# Patient Record
Sex: FEMALE | Race: WHITE | Employment: OTHER | URBAN - METROPOLITAN AREA
[De-identification: names, ages, dates, MRNs, and addresses within clinical notes are randomized per-mention and may not be internally consistent; named-entity substitution may affect disease eponyms.]

---

## 2017-05-01 ENCOUNTER — HOSPITAL ENCOUNTER (EMERGENCY)
Facility: HOSPITAL | Age: 37
Discharge: HOME | End: 2017-05-01
Attending: EMERGENCY MEDICINE
Payer: MEDICARE

## 2017-05-01 ENCOUNTER — APPOINTMENT (OUTPATIENT)
Dept: CT IMAGING | Facility: HOSPITAL | Age: 37
End: 2017-05-01
Attending: EMERGENCY MEDICINE
Payer: MEDICARE

## 2017-05-01 VITALS
DIASTOLIC BLOOD PRESSURE: 78 MMHG | OXYGEN SATURATION: 96 % | SYSTOLIC BLOOD PRESSURE: 142 MMHG | WEIGHT: 284.38 LBS | HEART RATE: 76 BPM | TEMPERATURE: 98 F | BODY MASS INDEX: 50 KG/M2 | RESPIRATION RATE: 17 BRPM

## 2017-05-01 DIAGNOSIS — R10.9 FLANK PAIN: Primary | ICD-10-CM

## 2017-05-01 PROCEDURE — 74176 CT ABD & PELVIS W/O CONTRAST: CPT

## 2017-05-01 PROCEDURE — 85025 COMPLETE CBC W/AUTO DIFF WBC: CPT | Performed by: EMERGENCY MEDICINE

## 2017-05-01 PROCEDURE — 81003 URINALYSIS AUTO W/O SCOPE: CPT | Performed by: EMERGENCY MEDICINE

## 2017-05-01 PROCEDURE — 99284 EMERGENCY DEPT VISIT MOD MDM: CPT

## 2017-05-01 PROCEDURE — 96374 THER/PROPH/DIAG INJ IV PUSH: CPT

## 2017-05-01 PROCEDURE — 81025 URINE PREGNANCY TEST: CPT

## 2017-05-01 PROCEDURE — 80053 COMPREHEN METABOLIC PANEL: CPT | Performed by: EMERGENCY MEDICINE

## 2017-05-01 RX ORDER — KETOROLAC TROMETHAMINE 30 MG/ML
15 INJECTION, SOLUTION INTRAMUSCULAR; INTRAVENOUS ONCE
Status: COMPLETED | OUTPATIENT
Start: 2017-05-01 | End: 2017-05-01

## 2017-05-02 NOTE — ED INITIAL ASSESSMENT (HPI)
PT arrives with c/o right flank pain that radiates to her right groin since this morning. Denies urinary sx, nvd.

## 2017-05-02 NOTE — ED PROVIDER NOTES
Patient Seen in: [facility] Emergency Department    History   Patient presents with:  Abdomen/Flank Pain (GI/)    Stated Complaint: R GROIN PAIN    HPI    Patient is a 42-year-old with a history of bilateral congenital hearing loss, previous benito elements reviewed from today and agreed except as otherwise stated in HPI.     Physical Exam       ED Triage Vitals   BP 05/01/17 2212 145/90 mmHg   Pulse 05/01/17 2212 87   Resp 05/01/17 2212 16   Temp 05/01/17 2212 97.8 °F (36.6 °C)   Temp src 05/01/17 22 LAVENDER   RAINBOW DRAW LIGHT GREEN     CT abdomen and pelvis: No obstructing urolithiasis. There is a 5 mm right middle lobe nodule.   FINDINGS:    LUNG BASES:  5 mm right middle lobe nodule on image #17  LIVER:  Normal in shape and contour but limited ev 2.25

## 2017-10-21 ENCOUNTER — HOSPITAL ENCOUNTER (EMERGENCY)
Facility: HOSPITAL | Age: 37
Discharge: HOME | End: 2017-10-21
Attending: EMERGENCY MEDICINE
Payer: MEDICARE

## 2017-10-21 VITALS
HEART RATE: 81 BPM | OXYGEN SATURATION: 100 % | SYSTOLIC BLOOD PRESSURE: 128 MMHG | RESPIRATION RATE: 14 BRPM | BODY MASS INDEX: 48.65 KG/M2 | WEIGHT: 285 LBS | DIASTOLIC BLOOD PRESSURE: 81 MMHG | TEMPERATURE: 99 F | HEIGHT: 64 IN

## 2017-10-21 DIAGNOSIS — R19.7 NAUSEA VOMITING AND DIARRHEA: ICD-10-CM

## 2017-10-21 DIAGNOSIS — N30.00 ACUTE CYSTITIS WITHOUT HEMATURIA: Primary | ICD-10-CM

## 2017-10-21 DIAGNOSIS — R11.2 NAUSEA VOMITING AND DIARRHEA: ICD-10-CM

## 2017-10-21 PROCEDURE — 83690 ASSAY OF LIPASE: CPT | Performed by: EMERGENCY MEDICINE

## 2017-10-21 PROCEDURE — 80053 COMPREHEN METABOLIC PANEL: CPT | Performed by: EMERGENCY MEDICINE

## 2017-10-21 PROCEDURE — 81001 URINALYSIS AUTO W/SCOPE: CPT | Performed by: EMERGENCY MEDICINE

## 2017-10-21 PROCEDURE — 85025 COMPLETE CBC W/AUTO DIFF WBC: CPT | Performed by: EMERGENCY MEDICINE

## 2017-10-21 PROCEDURE — 96361 HYDRATE IV INFUSION ADD-ON: CPT

## 2017-10-21 PROCEDURE — 99284 EMERGENCY DEPT VISIT MOD MDM: CPT

## 2017-10-21 PROCEDURE — 81025 URINE PREGNANCY TEST: CPT

## 2017-10-21 PROCEDURE — 87086 URINE CULTURE/COLONY COUNT: CPT | Performed by: EMERGENCY MEDICINE

## 2017-10-21 PROCEDURE — 96374 THER/PROPH/DIAG INJ IV PUSH: CPT

## 2017-10-21 RX ORDER — ONDANSETRON 4 MG/1
4 TABLET, ORALLY DISINTEGRATING ORAL EVERY 4 HOURS PRN
Qty: 10 TABLET | Refills: 0 | Status: SHIPPED | OUTPATIENT
Start: 2017-10-21 | End: 2017-10-28

## 2017-10-21 RX ORDER — ONDANSETRON 2 MG/ML
4 INJECTION INTRAMUSCULAR; INTRAVENOUS ONCE
Status: COMPLETED | OUTPATIENT
Start: 2017-10-21 | End: 2017-10-21

## 2017-10-21 RX ORDER — SULFAMETHOXAZOLE AND TRIMETHOPRIM 800; 160 MG/1; MG/1
1 TABLET ORAL 2 TIMES DAILY
Qty: 14 TABLET | Refills: 0 | Status: SHIPPED | OUTPATIENT
Start: 2017-10-21 | End: 2017-10-28

## 2017-10-21 NOTE — ED PROVIDER NOTES
Patient Seen in: [facility] Emergency Department    History   Patient presents with:  Abdomen/Flank Pain (GI/)    Stated Complaint: rlq pain, pt is deaf, but speaks    HPI    History is obtained through a language line  as patient is de 98 %  O2 Device: None (Room air)    Current:/88   Pulse 84   Temp 99 °F (37.2 °C) (Temporal)   Resp 16   Ht 162.6 cm (5' 4\")   Wt 129.3 kg   LMP 10/05/2017   SpO2 99%   BMI 48.92 kg/m²         Physical Exam      Constitutional: Pt is oriented to per orders were created for panel order CBC WITH DIFFERENTIAL WITH PLATELET.   Procedure                               Abnormality         Status                     ---------                               -----------         ------                     CBC W/ D Tablet Dispersible  Take 1 tablet (4 mg total) by mouth every 4 (four) hours as needed for Nausea.   Qty: 10 tablet Refills: 0

## 2018-01-23 ENCOUNTER — HOSPITAL ENCOUNTER (OUTPATIENT)
Age: 38
Discharge: HOME | End: 2018-01-23
Payer: MEDICARE

## 2018-01-23 VITALS
OXYGEN SATURATION: 99 % | TEMPERATURE: 98 F | HEART RATE: 87 BPM | RESPIRATION RATE: 16 BRPM | DIASTOLIC BLOOD PRESSURE: 75 MMHG | SYSTOLIC BLOOD PRESSURE: 126 MMHG

## 2018-01-23 DIAGNOSIS — J02.9 PHARYNGITIS, UNSPECIFIED ETIOLOGY: Primary | ICD-10-CM

## 2018-01-23 DIAGNOSIS — J30.9 ALLERGIC RHINITIS, UNSPECIFIED SEASONALITY, UNSPECIFIED TRIGGER: ICD-10-CM

## 2018-01-23 LAB — POCT RAPID STREP: NEGATIVE

## 2018-01-23 PROCEDURE — 87430 STREP A AG IA: CPT | Performed by: PHYSICIAN ASSISTANT

## 2018-01-23 PROCEDURE — 99214 OFFICE O/P EST MOD 30 MIN: CPT

## 2018-01-23 PROCEDURE — 87081 CULTURE SCREEN ONLY: CPT | Performed by: PHYSICIAN ASSISTANT

## 2018-01-23 PROCEDURE — 99213 OFFICE O/P EST LOW 20 MIN: CPT

## 2018-01-24 NOTE — ED PROVIDER NOTES
Patient Seen in: [facility]    History   Patient presents with:  Sore Throat    Stated Complaint: SORE THROAT     HPI    77-year-old female here with complaint of sore throat pain with a nonproductive cough for 3 days.   Patient' external ear and ear canal normal.   Left Ear: Tympanic membrane, external ear and ear canal normal.   Nose: Rhinorrhea present.    Mouth/Throat: Uvula is midline, oropharynx is clear and moist and mucous membranes are normal.   The midline, no trismus or d [address] [place] [address]  [phone]    Schedule an appointment as soon as possible for a visit  Follow-up and further evaluation.         Medications Prescribed:  Current Discharge Medication List

## 2018-01-25 ENCOUNTER — HOSPITAL ENCOUNTER (OUTPATIENT)
Age: 38
Discharge: HOME | End: 2018-01-25
Attending: FAMILY MEDICINE
Payer: MEDICARE

## 2018-01-25 VITALS
SYSTOLIC BLOOD PRESSURE: 141 MMHG | TEMPERATURE: 98 F | DIASTOLIC BLOOD PRESSURE: 102 MMHG | HEART RATE: 76 BPM | RESPIRATION RATE: 16 BRPM

## 2018-01-25 DIAGNOSIS — H92.02 ACUTE OTALGIA, LEFT: ICD-10-CM

## 2018-01-25 DIAGNOSIS — H69.82 EUSTACHIAN TUBE DYSFUNCTION, LEFT: ICD-10-CM

## 2018-01-25 DIAGNOSIS — H10.9 CONJUNCTIVITIS OF RIGHT EYE, UNSPECIFIED CONJUNCTIVITIS TYPE: Primary | ICD-10-CM

## 2018-01-25 PROCEDURE — 99214 OFFICE O/P EST MOD 30 MIN: CPT

## 2018-01-25 PROCEDURE — 99213 OFFICE O/P EST LOW 20 MIN: CPT

## 2018-01-25 RX ORDER — GENTAMICIN SULFATE 3 MG/ML
2 SOLUTION/ DROPS OPHTHALMIC 3 TIMES DAILY
Qty: 5 ML | Refills: 0 | Status: SHIPPED | OUTPATIENT
Start: 2018-01-25 | End: 2018-01-30

## 2018-01-25 NOTE — ED PROVIDER NOTES
Patient Seen in: [address] [facility] [facility]    History   Patient presents with:  Eye Problem    Stated Complaint: eye infection  /cough / Deleta Cydney ZARCO    This 57-year-old female presents the office with complaint of right eye redness and d right conjunctiva is inflamed. The left conjunctiva is mildly injected. No purulent drainage is noted. EARS: Tympanic membranes normal color but the left TM is retracted, no fluid is noted, EAC's normal.  NOSE: Turbinates congested, no bleeding noted. for next 5 days. You're not contagious after 24 hours of medication. Continue to use the Flonase 2 sprays to each nostril once daily after shower as long as you are congested. You may take Tylenol or ibuprofen as needed for fever or pain.   Push fluids a

## 2018-11-08 ENCOUNTER — HOSPITAL ENCOUNTER (EMERGENCY)
Facility: HOSPITAL | Age: 38
Discharge: HOME | End: 2018-11-08
Attending: EMERGENCY MEDICINE
Payer: MEDICARE

## 2018-11-08 ENCOUNTER — APPOINTMENT (OUTPATIENT)
Dept: GENERAL RADIOLOGY | Facility: HOSPITAL | Age: 38
End: 2018-11-08
Attending: EMERGENCY MEDICINE
Payer: MEDICARE

## 2018-11-08 VITALS
BODY MASS INDEX: 48.65 KG/M2 | HEART RATE: 76 BPM | OXYGEN SATURATION: 7 % | WEIGHT: 285 LBS | DIASTOLIC BLOOD PRESSURE: 76 MMHG | RESPIRATION RATE: 16 BRPM | SYSTOLIC BLOOD PRESSURE: 118 MMHG | TEMPERATURE: 99 F | HEIGHT: 64 IN

## 2018-11-08 DIAGNOSIS — M54.9 BACK PAIN WITHOUT RADICULOPATHY: Primary | ICD-10-CM

## 2018-11-08 DIAGNOSIS — K59.00 CONSTIPATION, UNSPECIFIED CONSTIPATION TYPE: ICD-10-CM

## 2018-11-08 PROCEDURE — 99284 EMERGENCY DEPT VISIT MOD MDM: CPT

## 2018-11-08 PROCEDURE — 81003 URINALYSIS AUTO W/O SCOPE: CPT | Performed by: EMERGENCY MEDICINE

## 2018-11-08 PROCEDURE — 74019 RADEX ABDOMEN 2 VIEWS: CPT | Performed by: EMERGENCY MEDICINE

## 2018-11-08 PROCEDURE — 72110 X-RAY EXAM L-2 SPINE 4/>VWS: CPT | Performed by: EMERGENCY MEDICINE

## 2018-11-08 RX ORDER — METHYLPREDNISOLONE 4 MG/1
TABLET ORAL
Qty: 1 PACKAGE | Refills: 0 | Status: SHIPPED | OUTPATIENT
Start: 2018-11-08 | End: 2018-11-13

## 2018-11-08 RX ORDER — POLYETHYLENE GLYCOL 3350 17 G/17G
17 POWDER, FOR SOLUTION ORAL DAILY PRN
Qty: 12 EACH | Refills: 0 | Status: SHIPPED | OUTPATIENT
Start: 2018-11-08 | End: 2018-12-08

## 2018-11-08 RX ORDER — NAPROXEN 500 MG/1
500 TABLET ORAL 2 TIMES DAILY PRN
Qty: 20 TABLET | Refills: 0 | Status: SHIPPED | OUTPATIENT
Start: 2018-11-08 | End: 2019-02-06 | Stop reason: ALTCHOICE

## 2018-11-08 RX ORDER — CYCLOBENZAPRINE HCL 10 MG
10 TABLET ORAL 3 TIMES DAILY PRN
Qty: 20 TABLET | Refills: 0 | Status: SHIPPED | OUTPATIENT
Start: 2018-11-08 | End: 2019-02-06 | Stop reason: ALTCHOICE

## 2018-11-08 NOTE — ED PROVIDER NOTES
Patient Seen in: [facility] Emergency Department    History   Patient presents with:  Back Pain (musculoskeletal)    Stated Complaint: back pain x 2 months    HPI    34 old female presents to the emergency department with complaints of low back pain 11/02/2018   SpO2 (!) 7%   BMI 48.92 kg/m²         Physical Exam   Constitutional: She is oriented to person, place, and time. She appears well-developed and well-nourished. HENT:   Head: Normocephalic and atraumatic.    Mouth/Throat: Oropharynx is clear hypertrophic changes at L5-S1 are noted.  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   Dictated by: Carol Gutierrez MD on 11/08/2018 at 10:23     Approved by: Carol Gutierrez MD            Xr times daily as needed. , Print Script, Disp-20 tablet, R-0    Cyclobenzaprine HCl 10 MG Oral Tab  Take 1 tablet (10 mg total) by mouth 3 (three) times daily as needed for Muscle spasms. , Print Script, Disp-20 tablet, R-0    PEG 3350 Oral Powd Pack  Take 17

## 2019-02-03 ENCOUNTER — HOSPITAL ENCOUNTER (OUTPATIENT)
Age: 39
Discharge: HOME | End: 2019-02-03
Payer: MEDICARE

## 2019-02-03 ENCOUNTER — APPOINTMENT (OUTPATIENT)
Dept: GENERAL RADIOLOGY | Age: 39
End: 2019-02-03
Attending: NURSE PRACTITIONER
Payer: MEDICARE

## 2019-02-03 VITALS
WEIGHT: 290 LBS | HEIGHT: 65 IN | RESPIRATION RATE: 16 BRPM | OXYGEN SATURATION: 98 % | TEMPERATURE: 98 F | BODY MASS INDEX: 48.32 KG/M2 | SYSTOLIC BLOOD PRESSURE: 115 MMHG | DIASTOLIC BLOOD PRESSURE: 70 MMHG | HEART RATE: 75 BPM

## 2019-02-03 DIAGNOSIS — J06.9 VIRAL UPPER RESPIRATORY TRACT INFECTION WITH COUGH: Primary | ICD-10-CM

## 2019-02-03 PROCEDURE — 99214 OFFICE O/P EST MOD 30 MIN: CPT

## 2019-02-03 PROCEDURE — 99213 OFFICE O/P EST LOW 20 MIN: CPT

## 2019-02-03 PROCEDURE — 71046 X-RAY EXAM CHEST 2 VIEWS: CPT | Performed by: NURSE PRACTITIONER

## 2019-02-03 RX ORDER — METHYLPREDNISOLONE 4 MG/1
TABLET ORAL
Qty: 1 PACKAGE | Refills: 0 | Status: SHIPPED | OUTPATIENT
Start: 2019-02-03 | End: 2019-02-06 | Stop reason: SINTOL

## 2019-02-03 NOTE — ED PROVIDER NOTES
Patient Seen in: [facility]    History   Patient presents with:  Headache (neurologic)    Stated Complaint: Michelle Lemon, COUGH    78-year-old female presents today with complaints of sinus pressure congestion and hea 01/28/2019   BMI 48.26 kg/m²         Physical Exam   Constitutional: She is oriented to person, place, and time. She appears well-developed and well-nourished. No distress. HENT:   Head: Normocephalic.    Right Ear: Tympanic membrane and ear canal normal. given prescription for Medrol Dosepak to help with inflammation and sinus pressure. Patient encouraged take over-the-counter antihistamine decongestant and to push fluids rest.  Also suggested using a coolmist humidifier at the bedside.   To follow with pr

## 2019-02-03 NOTE — ED INITIAL ASSESSMENT (HPI)
Cough with sinus pressure/congestion/headache x 4 days. States she gets sweaty but denies fevers. When she blows her nose she feels discomfort in neck.

## 2019-07-20 ENCOUNTER — HOSPITAL ENCOUNTER (INPATIENT)
Facility: HOSPITAL | Age: 39
LOS: 9 days | Discharge: HOME HEALTH | DRG: 854 | End: 2019-07-29
Attending: EMERGENCY MEDICINE | Admitting: SURGERY
Payer: MEDICARE

## 2019-07-20 ENCOUNTER — APPOINTMENT (OUTPATIENT)
Dept: CT IMAGING | Facility: HOSPITAL | Age: 39
DRG: 854 | End: 2019-07-20
Attending: EMERGENCY MEDICINE
Payer: MEDICARE

## 2019-07-20 ENCOUNTER — ANESTHESIA EVENT (OUTPATIENT)
Dept: SURGERY | Facility: HOSPITAL | Age: 39
DRG: 854 | End: 2019-07-20
Payer: MEDICARE

## 2019-07-20 ENCOUNTER — ANESTHESIA (OUTPATIENT)
Dept: SURGERY | Facility: HOSPITAL | Age: 39
DRG: 854 | End: 2019-07-20
Payer: MEDICARE

## 2019-07-20 DIAGNOSIS — M79.659 THIGH PAIN: ICD-10-CM

## 2019-07-20 DIAGNOSIS — N73.9 PELVIC ABSCESS IN FEMALE: Primary | ICD-10-CM

## 2019-07-20 DIAGNOSIS — N76.89 FOURNIER'S GANGRENE IN FEMALE: ICD-10-CM

## 2019-07-20 LAB
ALBUMIN SERPL-MCNC: 4.1 G/DL (ref 3.4–5)
ALBUMIN/GLOB SERPL: 1 {RATIO} (ref 1–2)
ALP LIVER SERPL-CCNC: 113 U/L (ref 37–98)
ALT SERPL-CCNC: 18 U/L (ref 13–56)
ANION GAP SERPL CALC-SCNC: 8 MMOL/L (ref 0–18)
AST SERPL-CCNC: 8 U/L (ref 15–37)
BASOPHILS # BLD AUTO: 0.04 X10(3) UL (ref 0–0.2)
BASOPHILS NFR BLD AUTO: 0.2 %
BILIRUB SERPL-MCNC: 1.1 MG/DL (ref 0.1–2)
BILIRUB UR QL STRIP.AUTO: NEGATIVE
BUN BLD-MCNC: 14 MG/DL (ref 7–18)
BUN/CREAT SERPL: 16.3 (ref 10–20)
CALCIUM BLD-MCNC: 9.6 MG/DL (ref 8.5–10.1)
CHLORIDE SERPL-SCNC: 101 MMOL/L (ref 98–112)
CO2 SERPL-SCNC: 26 MMOL/L (ref 21–32)
CREAT BLD-MCNC: 0.86 MG/DL (ref 0.55–1.02)
DEPRECATED RDW RBC AUTO: 40.5 FL (ref 35.1–46.3)
EOSINOPHIL # BLD AUTO: 0.05 X10(3) UL (ref 0–0.7)
EOSINOPHIL NFR BLD AUTO: 0.2 %
ERYTHROCYTE [DISTWIDTH] IN BLOOD BY AUTOMATED COUNT: 12.8 % (ref 11–15)
EST. AVERAGE GLUCOSE BLD GHB EST-MCNC: 108 MG/DL (ref 68–126)
GLOBULIN PLAS-MCNC: 4.2 G/DL (ref 2.8–4.4)
GLUCOSE BLD-MCNC: 113 MG/DL (ref 70–99)
GLUCOSE UR STRIP.AUTO-MCNC: NEGATIVE MG/DL
HBA1C MFR BLD HPLC: 5.4 % (ref ?–5.7)
HCT VFR BLD AUTO: 41.9 % (ref 35–48)
HGB BLD-MCNC: 14.1 G/DL (ref 12–16)
IMM GRANULOCYTES # BLD AUTO: 0.18 X10(3) UL (ref 0–1)
IMM GRANULOCYTES NFR BLD: 0.8 %
KETONES UR STRIP.AUTO-MCNC: 20 MG/DL
LACTATE SERPL-SCNC: 1.1 MMOL/L (ref 0.4–2)
LACTATE SERPL-SCNC: 1.1 MMOL/L (ref 0.4–2)
LEUKOCYTE ESTERASE UR QL STRIP.AUTO: NEGATIVE
LYMPHOCYTES # BLD AUTO: 1.44 X10(3) UL (ref 1–4)
LYMPHOCYTES NFR BLD AUTO: 6.4 %
M PROTEIN MFR SERPL ELPH: 8.3 G/DL (ref 6.4–8.2)
MCH RBC QN AUTO: 29 PG (ref 26–34)
MCHC RBC AUTO-ENTMCNC: 33.7 G/DL (ref 31–37)
MCV RBC AUTO: 86 FL (ref 80–100)
MONOCYTES # BLD AUTO: 1.28 X10(3) UL (ref 0.1–1)
MONOCYTES NFR BLD AUTO: 5.7 %
NEUTROPHILS # BLD AUTO: 19.34 X10 (3) UL (ref 1.5–7.7)
NEUTROPHILS # BLD AUTO: 19.34 X10(3) UL (ref 1.5–7.7)
NEUTROPHILS NFR BLD AUTO: 86.7 %
NITRITE UR QL STRIP.AUTO: NEGATIVE
OSMOLALITY SERPL CALC.SUM OF ELEC: 281 MOSM/KG (ref 275–295)
PH UR STRIP.AUTO: 5 [PH] (ref 4.5–8)
PLATELET # BLD AUTO: 385 10(3)UL (ref 150–450)
POCT URINE PREGNANCY: NEGATIVE
POTASSIUM SERPL-SCNC: 2.9 MMOL/L (ref 3.5–5.1)
POTASSIUM SERPL-SCNC: 3.2 MMOL/L (ref 3.5–5.1)
PROCALCITONIN SERPL-MCNC: 0.12 NG/ML
PROT UR STRIP.AUTO-MCNC: 30 MG/DL
RBC # BLD AUTO: 4.87 X10(6)UL (ref 3.8–5.3)
RBC UR QL AUTO: NEGATIVE
SODIUM SERPL-SCNC: 135 MMOL/L (ref 136–145)
SP GR UR STRIP.AUTO: 1.03 (ref 1–1.03)
UROBILINOGEN UR STRIP.AUTO-MCNC: <2 MG/DL
WBC # BLD AUTO: 22.3 X10(3) UL (ref 4–11)

## 2019-07-20 PROCEDURE — 0JDB0ZZ EXTRACTION OF PERINEUM SUBCUTANEOUS TISSUE AND FASCIA, OPEN APPROACH: ICD-10-PCS | Performed by: SURGERY

## 2019-07-20 PROCEDURE — 74177 CT ABD & PELVIS W/CONTRAST: CPT | Performed by: EMERGENCY MEDICINE

## 2019-07-20 PROCEDURE — 99291 CRITICAL CARE FIRST HOUR: CPT | Performed by: NURSE PRACTITIONER

## 2019-07-20 RX ORDER — DOCUSATE SODIUM 100 MG/1
100 CAPSULE, LIQUID FILLED ORAL 2 TIMES DAILY
Status: DISCONTINUED | OUTPATIENT
Start: 2019-07-20 | End: 2019-07-29

## 2019-07-20 RX ORDER — POLYETHYLENE GLYCOL 3350 17 G/17G
17 POWDER, FOR SOLUTION ORAL DAILY PRN
Status: DISCONTINUED | OUTPATIENT
Start: 2019-07-20 | End: 2019-07-29

## 2019-07-20 RX ORDER — HYDROMORPHONE HYDROCHLORIDE 1 MG/ML
0.4 INJECTION, SOLUTION INTRAMUSCULAR; INTRAVENOUS; SUBCUTANEOUS EVERY 5 MIN PRN
Status: DISCONTINUED | OUTPATIENT
Start: 2019-07-20 | End: 2019-07-20 | Stop reason: HOSPADM

## 2019-07-20 RX ORDER — ACETAMINOPHEN 325 MG/1
650 TABLET ORAL EVERY 4 HOURS PRN
Status: DISCONTINUED | OUTPATIENT
Start: 2019-07-20 | End: 2019-07-29

## 2019-07-20 RX ORDER — SODIUM CHLORIDE 9 MG/ML
INJECTION, SOLUTION INTRAVENOUS CONTINUOUS
Status: DISCONTINUED | OUTPATIENT
Start: 2019-07-20 | End: 2019-07-20

## 2019-07-20 RX ORDER — HYDROMORPHONE HYDROCHLORIDE 1 MG/ML
0.4 INJECTION, SOLUTION INTRAMUSCULAR; INTRAVENOUS; SUBCUTANEOUS EVERY 2 HOUR PRN
Status: DISCONTINUED | OUTPATIENT
Start: 2019-07-20 | End: 2019-07-29

## 2019-07-20 RX ORDER — SODIUM PHOSPHATE, DIBASIC AND SODIUM PHOSPHATE, MONOBASIC 7; 19 G/133ML; G/133ML
1 ENEMA RECTAL ONCE AS NEEDED
Status: DISCONTINUED | OUTPATIENT
Start: 2019-07-20 | End: 2019-07-29

## 2019-07-20 RX ORDER — ONDANSETRON 2 MG/ML
4 INJECTION INTRAMUSCULAR; INTRAVENOUS EVERY 4 HOURS PRN
Status: DISCONTINUED | OUTPATIENT
Start: 2019-07-20 | End: 2019-07-20

## 2019-07-20 RX ORDER — HYDROCODONE BITARTRATE AND ACETAMINOPHEN 5; 325 MG/1; MG/1
1 TABLET ORAL EVERY 4 HOURS PRN
Status: DISCONTINUED | OUTPATIENT
Start: 2019-07-20 | End: 2019-07-29

## 2019-07-20 RX ORDER — ACETAMINOPHEN 500 MG
TABLET ORAL
Status: DISPENSED
Start: 2019-07-20 | End: 2019-07-21

## 2019-07-20 RX ORDER — KETOROLAC TROMETHAMINE 30 MG/ML
30 INJECTION, SOLUTION INTRAMUSCULAR; INTRAVENOUS ONCE
Status: COMPLETED | OUTPATIENT
Start: 2019-07-20 | End: 2019-07-20

## 2019-07-20 RX ORDER — SODIUM CHLORIDE, SODIUM LACTATE, POTASSIUM CHLORIDE, CALCIUM CHLORIDE 600; 310; 30; 20 MG/100ML; MG/100ML; MG/100ML; MG/100ML
INJECTION, SOLUTION INTRAVENOUS CONTINUOUS
Status: DISCONTINUED | OUTPATIENT
Start: 2019-07-20 | End: 2019-07-29

## 2019-07-20 RX ORDER — MORPHINE SULFATE 4 MG/ML
4 INJECTION, SOLUTION INTRAMUSCULAR; INTRAVENOUS EVERY 30 MIN PRN
Status: DISCONTINUED | OUTPATIENT
Start: 2019-07-20 | End: 2019-07-20

## 2019-07-20 RX ORDER — METOCLOPRAMIDE HYDROCHLORIDE 5 MG/ML
10 INJECTION INTRAMUSCULAR; INTRAVENOUS EVERY 8 HOURS PRN
Status: DISCONTINUED | OUTPATIENT
Start: 2019-07-20 | End: 2019-07-29

## 2019-07-20 RX ORDER — HYDROMORPHONE HYDROCHLORIDE 1 MG/ML
0.8 INJECTION, SOLUTION INTRAMUSCULAR; INTRAVENOUS; SUBCUTANEOUS EVERY 2 HOUR PRN
Status: DISCONTINUED | OUTPATIENT
Start: 2019-07-20 | End: 2019-07-29

## 2019-07-20 RX ORDER — SODIUM CHLORIDE, SODIUM LACTATE, POTASSIUM CHLORIDE, CALCIUM CHLORIDE 600; 310; 30; 20 MG/100ML; MG/100ML; MG/100ML; MG/100ML
INJECTION, SOLUTION INTRAVENOUS CONTINUOUS
Status: DISCONTINUED | OUTPATIENT
Start: 2019-07-20 | End: 2019-07-20 | Stop reason: HOSPADM

## 2019-07-20 RX ORDER — KETOROLAC TROMETHAMINE 30 MG/ML
30 INJECTION, SOLUTION INTRAMUSCULAR; INTRAVENOUS EVERY 6 HOURS PRN
Status: DISPENSED | OUTPATIENT
Start: 2019-07-20 | End: 2019-07-22

## 2019-07-20 RX ORDER — POTASSIUM CHLORIDE 14.9 MG/ML
20 INJECTION INTRAVENOUS ONCE
Status: COMPLETED | OUTPATIENT
Start: 2019-07-21 | End: 2019-07-21

## 2019-07-20 RX ORDER — HYDROCODONE BITARTRATE AND ACETAMINOPHEN 5; 325 MG/1; MG/1
2 TABLET ORAL EVERY 4 HOURS PRN
Status: DISCONTINUED | OUTPATIENT
Start: 2019-07-20 | End: 2019-07-29

## 2019-07-20 RX ORDER — DEXTROSE, SODIUM CHLORIDE, AND POTASSIUM CHLORIDE 5; .45; .15 G/100ML; G/100ML; G/100ML
INJECTION INTRAVENOUS CONTINUOUS
Status: DISCONTINUED | OUTPATIENT
Start: 2019-07-20 | End: 2019-07-20

## 2019-07-20 RX ORDER — CLINDAMYCIN PHOSPHATE 600 MG/50ML
600 INJECTION INTRAVENOUS EVERY 8 HOURS
Status: DISCONTINUED | OUTPATIENT
Start: 2019-07-20 | End: 2019-07-24

## 2019-07-20 RX ORDER — ONDANSETRON 2 MG/ML
4 INJECTION INTRAMUSCULAR; INTRAVENOUS EVERY 6 HOURS PRN
Status: DISCONTINUED | OUTPATIENT
Start: 2019-07-20 | End: 2019-07-29

## 2019-07-20 RX ORDER — BISACODYL 10 MG
10 SUPPOSITORY, RECTAL RECTAL
Status: DISCONTINUED | OUTPATIENT
Start: 2019-07-20 | End: 2019-07-29

## 2019-07-20 RX ORDER — HYDROMORPHONE HYDROCHLORIDE 1 MG/ML
0.2 INJECTION, SOLUTION INTRAMUSCULAR; INTRAVENOUS; SUBCUTANEOUS EVERY 2 HOUR PRN
Status: DISCONTINUED | OUTPATIENT
Start: 2019-07-20 | End: 2019-07-29

## 2019-07-20 RX ORDER — NALOXONE HYDROCHLORIDE 0.4 MG/ML
80 INJECTION, SOLUTION INTRAMUSCULAR; INTRAVENOUS; SUBCUTANEOUS AS NEEDED
Status: DISCONTINUED | OUTPATIENT
Start: 2019-07-20 | End: 2019-07-20 | Stop reason: HOSPADM

## 2019-07-20 RX ORDER — ACETAMINOPHEN 500 MG
1000 TABLET ORAL EVERY 4 HOURS PRN
Status: DISCONTINUED | OUTPATIENT
Start: 2019-07-20 | End: 2019-07-20

## 2019-07-20 RX ORDER — CLINDAMYCIN PHOSPHATE 600 MG/50ML
600 INJECTION INTRAVENOUS ONCE
Status: COMPLETED | OUTPATIENT
Start: 2019-07-20 | End: 2019-07-20

## 2019-07-20 NOTE — ED PROVIDER NOTES
Patient Seen in: [facility] Emergency Department    History   Patient presents with:  Fever (infectious)    Stated Complaint: fever    HPI  History and physical obtained with assistance of   Last ate at 6:30 PM 7/19/19  Last complaint: fever  Other systems are as noted in HPI. Constitutional and vital signs reviewed. All other systems reviewed and negative except as noted above.     Physical Exam     ED Triage Vitals [07/20/19 1402]   BP (!) 137/105   Pulse 116   Resp 18 Normal   POCT PREGNANCY, URINE - Normal   CBC WITH DIFFERENTIAL WITH PLATELET    Narrative: The following orders were created for panel order CBC WITH DIFFERENTIAL WITH PLATELET.   Procedure                               Abnormality         Status thickening. ABDOMINAL WALL:  Subcutaneous emphysematous changes in the perineal region particularly in the right parasagittal perineal region is noted. Associated infiltration of the fat inflammatory changes are noted.   A fluid collection is not identifi added to regimen. She has already received clindamycin. Discussed case with Dr. Abelino Duong who came to bedside and took patient emergently to operating room. Plan was discussed with patient via  phone by myself as well as Dr. Suzanna Pedraza.   All quest

## 2019-07-20 NOTE — ANESTHESIA PREPROCEDURE EVALUATION
PRE-OP EVALUATION    Patient Name: Jack Bell    Pre-op Diagnosis: Thigh pain [M79.659]    Procedure(s):  INCISION AND DRAINAGE RIGHT GROIN/PERINEUM    Surgeon(s) and Role:     Adair Copeland MD - Primary    Pre-op vitals reviewed.   Temp: 102.2 °F (39 status: Current Some Day Smoker        Packs/day: 0.50        Years: 15.00        Pack years: 7.5        Types: Cigarettes      Smokeless tobacco: Never Used      Tobacco comment: RARELY    Alcohol use: No      Alcohol/week: 0.0 standard drinks      [place]

## 2019-07-20 NOTE — ED NOTES
Received verbal permission from patient to speak with her brother, Nery Gardner, about her test results and POC.  He can be reached at [phone]

## 2019-07-20 NOTE — CONSULTS
[facility]  Report of Consultation    Maye Garcia Patient Status:  Emergency    1980 MRN [id number]   Location [address] [facility] [address] Attending Antoinette Murdock, [address] Day # 0 PCP Wong Elmore MD     Reason for [facility] palpitations   GI: denies nausea, vomiting, constipation, diarrhea; no rectal bleeding; no heartburn  GENITAL/: no dysuria, urgency or frequency, no tea colored urine  MUSCULOSKELETAL: no joint complaints upper or lower extremities  HEMATOLOGY: denies hx voiced understanding      Daryn Manrique MD  7/20/2019  4:39 PM

## 2019-07-20 NOTE — ED NOTES
MD at bedside with ipad interpretor to explain the results of her CT scan and POC. Family at bedside.

## 2019-07-20 NOTE — ANESTHESIA POSTPROCEDURE EVALUATION
[address] [place] [address] Patient Status:  Inpatient   Age/Gender 34year old female MRN [id number]   [facility] SURGERY Attending Bisi Conway MD   Hosp Day # 0 PCP Owen Lafleur MD       Anesthesia Post-op Note    Procedure(s):

## 2019-07-20 NOTE — ED INITIAL ASSESSMENT (HPI)
Per patient, reporting fever and bloating feeling abdomen that started today. Has external vaginal swelling with pain started 4 days ago. AAO 4. Hard to sit.

## 2019-07-21 LAB
ANION GAP SERPL CALC-SCNC: 7 MMOL/L (ref 0–18)
BUN BLD-MCNC: 11 MG/DL (ref 7–18)
BUN/CREAT SERPL: 20.4 (ref 10–20)
CALCIUM BLD-MCNC: 7.9 MG/DL (ref 8.5–10.1)
CHLORIDE SERPL-SCNC: 110 MMOL/L (ref 98–112)
CO2 SERPL-SCNC: 24 MMOL/L (ref 21–32)
CREAT BLD-MCNC: 0.54 MG/DL (ref 0.55–1.02)
CREAT BLD-MCNC: 0.54 MG/DL (ref 0.55–1.02)
DEPRECATED RDW RBC AUTO: 42.2 FL (ref 35.1–46.3)
ERYTHROCYTE [DISTWIDTH] IN BLOOD BY AUTOMATED COUNT: 12.9 % (ref 11–15)
GLUCOSE BLD-MCNC: 127 MG/DL (ref 70–99)
HAV IGM SER QL: 1.7 MG/DL (ref 1.6–2.6)
HCT VFR BLD AUTO: 34.7 % (ref 35–48)
HGB BLD-MCNC: 11.4 G/DL (ref 12–16)
MCH RBC QN AUTO: 28.9 PG (ref 26–34)
MCHC RBC AUTO-ENTMCNC: 32.9 G/DL (ref 31–37)
MCV RBC AUTO: 88.1 FL (ref 80–100)
OSMOLALITY SERPL CALC.SUM OF ELEC: 293 MOSM/KG (ref 275–295)
PHOSPHATE SERPL-MCNC: 2.7 MG/DL (ref 2.5–4.9)
PLATELET # BLD AUTO: 276 10(3)UL (ref 150–450)
POTASSIUM SERPL-SCNC: 3.5 MMOL/L (ref 3.5–5.1)
RBC # BLD AUTO: 3.94 X10(6)UL (ref 3.8–5.3)
SODIUM SERPL-SCNC: 141 MMOL/L (ref 136–145)
WBC # BLD AUTO: 19.1 X10(3) UL (ref 4–11)

## 2019-07-21 RX ORDER — MAGNESIUM SULFATE HEPTAHYDRATE 40 MG/ML
2 INJECTION, SOLUTION INTRAVENOUS ONCE
Status: COMPLETED | OUTPATIENT
Start: 2019-07-21 | End: 2019-07-21

## 2019-07-21 RX ORDER — ENOXAPARIN SODIUM 100 MG/ML
0.5 INJECTION SUBCUTANEOUS DAILY
Status: DISCONTINUED | OUTPATIENT
Start: 2019-07-21 | End: 2019-07-29

## 2019-07-21 RX ORDER — CALCIUM CARBONATE 200(500)MG
500 TABLET,CHEWABLE ORAL
Status: DISCONTINUED | OUTPATIENT
Start: 2019-07-21 | End: 2019-07-29

## 2019-07-21 NOTE — PROGRESS NOTES
[facility] Pharmacy Progress Note:  Anticoagulation Weight Dose Adjustment for enoxaparin (LOVENOX)    Ramin Villarreal is a 34year old female who has been prescribed enoxaparin (LOVENOX) 40mg for VTE prophylaxis.       Estimated Creatinine Clearance: 119.6 mL/min (A

## 2019-07-21 NOTE — PROGRESS NOTES
ICU  Critical Care APRN Progress Note    NAME: Leopold Groom - ROOM: [facility] - MRN: [id number] - Age: 34year old - :1980    History Of Present Illness:  Leopold Groom is a 34year old female with PMHx significant for congenital deafness--wears hear Removal gallbladder       Social Hx:  Social History    Tobacco Use      Smoking status: Current Some Day Smoker        Packs/day: 0.50        Years: 15.00        Pack years: 7.5        Types: Cigarettes      Smokeless tobacco: Never Used      Tobacco comm extending towards the right inguinal region is noted. This critical result was discussed with Dr. Hector Francisco at 1552 hours on 7/20/2019. Read back was performed.     Dictated by: Josefina Dugan MD on 7/20/2019 at 15:49     Approved by: Josefina Dugan,

## 2019-07-21 NOTE — H&P
[facility] Hospitalist H&P       CC: Patient presents with:  Fever (infectious)       PCP: Graciela Durant MD    History of Present Illness:  Patient is a 34year old female with PMH sig for congenital deafness presenting with h/o lesion on R groin which was Packs/day: 0.50        Years: 15.00        Pack years: 7.5        Types: Cigarettes      Smokeless tobacco: Never Used      Tobacco comment: RARELY    Alcohol use: No      Alcohol/week: 0.0 standard drinks      Frequency: Never       Fam Hx  Family History material. Post contrast coronal MPR imaging was performed. Dose reduction techniques were used. Dose information is transmitted to the [facility]) NRDR ([address]) which includes the Dose Index Registry.   PAT ASSESSMENT / PLAN:     # Dejuan's gangrene  - s/p I&D 7/20  - apprec gen surg and ID recommendations  - cont rhoda, clinda, and vanco per ID (PCN allergy)  - f/u culture results    # Groin/buttock postop pain  - 2/2 above, as expected  - currently

## 2019-07-21 NOTE — PLAN OF CARE
Received after rn report at 1. Awake and oriented x4. Able to communicate by lip reading, writing and signing to family. Weaned to room air but desated. to 88-89%. Placed on 2l nc and on adelia protocol.  Right groin incision with packing intact and corrie p ordered antiemetic medications  - Provide nonpharmacologic comfort measures as appropriate  - Advance diet as tolerated, if ordered  - Obtain nutritional consult as needed  - Evaluate fluid balance  Outcome: Progressing     Problem: METABOLIC/FLUID AND BEATRICE drain sites and surrounding tissue  - Implement wound care per orders  - Initiate isolation precautions as appropriate  - Initiate Pressure Ulcer prevention bundle as indicated  Outcome: Progressing     Problem: HEMATOLOGIC - ADULT  Goal: Maintains hematol

## 2019-07-21 NOTE — PROGRESS NOTES
[facility]  Progress Note    Rolly Cantu Patient Status:  Inpatient    1980 MRN [id number]   [facility] 4SW-A Attending Josette Smith MD   Hosp Day # 1 PCP Lj Cruz MD     Subjective:  Comfortable. Pain controlled.

## 2019-07-21 NOTE — CONSULTS
[facility] Pharmacy Note: Antimicrobial Weight Dose Adjustment for: meropenem (Alicia Landry)    Elaine Guevara is a 34year old female who has been prescribed meropenem (MERREM) 500 mg every 8 hours.   CrCl is estimated creatinine clearance is 75.1 mL/min (based on SCr of

## 2019-07-21 NOTE — CONSULTS
INFECTIOUS DISEASE CONSULT NOTE    Ramin Villarreal Patient Status:  Inpatient    1980 MRN [id number]   [facility] 4SW-A Attending Alexandru Sharma MD   Hosp Day # 1 PCP Brian Bocanegra never used smokeless tobacco. She reports that she does not drink alcohol or use drugs.     Allergies:    Codeine                 RASH  Pcn [Penicillins]       RASH    Medications:    Current Facility-Administered Medications:   •  Magnesium Sulfate IVPB pr sodium chloride 0.9% 500 mL IVPB, 15 mg/kg (Adjusted), Intravenous, Q12H  •  lactated ringers infusion, , Intravenous, Continuous    Review of Systems:    Completed. See pertinent positives and negatives in the the HPI.     Physical Exam:    General: No acu NITRITE Negative   LEUUR Negative   WBCUR None Seen   RBCUR 0-2   BACUR None Seen   EPIUR Moderate*         Microbiology    Reviewed in EMR,    Radiology: Ct Abdomen Pelvis Iv Contrast, No Oral (er)    Result Date: 7/20/2019  PROCEDURE:  CT ABDOMEN PELVI visible mass. Pelvic organs appropriate for patient age. BONES:  No bony lesion or fracture. LUNG BASES:  No visible pulmonary or pleural disease. OTHER:  Negative.        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

## 2019-07-21 NOTE — PLAN OF CARE
Pt received this AM, A&OX4. Deaf, able to communicate with writing, lip reading, sign language. O2 maintained on 2L NC.  NSR on monitor, BP stable, afebrile. Lytes replaced. Tolerating clears with minimal nausea, advance diet as tolerated.   Nguyen intac

## 2019-07-21 NOTE — CONSULTS
Critical Care H&P/Consult     NAME: Eusebio Lora - ROOM: [facility] - MRN: [id number] - Age: 34year old - :  1980    Date of Admission: 2019  1:47 PM  Admission Diagnosis: Pelvic abscess in female [N73.9]      Assessment/Plan:  1.  Mathieu soriano RASH  Pcn [Penicillins]       RASH  No current outpatient medications on file.     Magnesium Sulfate IVPB premix SOLN 2 g 2 g Intravenous Once   potassium chloride 40 mEq in sodium chloride 0.9% 250 mL IVPB 40 mEq Intravenous Once   Enoxaparin Sodium (LOVEN (DULCOLAX) rectal suppository 10 mg 10 mg Rectal Daily PRN   FLEET ENEMA (FLEET) 7-19 GM/118ML enema 133 mL 1 enema Rectal Once PRN   [COMPLETED] Vancomycin HCl (VANCOCIN) 2,000 mg in sodium chloride 0.9% 500 mL IVPB 25 mg/kg Intravenous Once   Followed by GFRNAA 85  --  118  118   CA 9.6  --  7.9*   ALB 4.1  --   --    *  --  141   K 3.2* 2.9* 3.5     --  110   CO2 26.0  --  24.0   ALKPHO 113*  --   --    AST 8*  --   --    ALT 18  --   --    BILT 1.1  --   --    TP 8.3*  --   --      No resul

## 2019-07-22 LAB
ANION GAP SERPL CALC-SCNC: 5 MMOL/L (ref 0–18)
BASOPHILS # BLD AUTO: 0.03 X10(3) UL (ref 0–0.2)
BASOPHILS NFR BLD AUTO: 0.2 %
BUN BLD-MCNC: 11 MG/DL (ref 7–18)
BUN/CREAT SERPL: 23.9 (ref 10–20)
CALCIUM BLD-MCNC: 8.6 MG/DL (ref 8.5–10.1)
CHLORIDE SERPL-SCNC: 107 MMOL/L (ref 98–112)
CO2 SERPL-SCNC: 27 MMOL/L (ref 21–32)
CREAT BLD-MCNC: 0.46 MG/DL (ref 0.55–1.02)
CREAT BLD-MCNC: 0.46 MG/DL (ref 0.55–1.02)
DEPRECATED RDW RBC AUTO: 43.1 FL (ref 35.1–46.3)
EOSINOPHIL # BLD AUTO: 0.22 X10(3) UL (ref 0–0.7)
EOSINOPHIL NFR BLD AUTO: 1.7 %
ERYTHROCYTE [DISTWIDTH] IN BLOOD BY AUTOMATED COUNT: 13.2 % (ref 11–15)
GLUCOSE BLD-MCNC: 101 MG/DL (ref 70–99)
HAV IGM SER QL: 2.2 MG/DL (ref 1.6–2.6)
HCT VFR BLD AUTO: 32.6 % (ref 35–48)
HGB BLD-MCNC: 10.6 G/DL (ref 12–16)
IMM GRANULOCYTES # BLD AUTO: 0.05 X10(3) UL (ref 0–1)
IMM GRANULOCYTES NFR BLD: 0.4 %
LYMPHOCYTES # BLD AUTO: 1.16 X10(3) UL (ref 1–4)
LYMPHOCYTES NFR BLD AUTO: 8.8 %
MCH RBC QN AUTO: 28.8 PG (ref 26–34)
MCHC RBC AUTO-ENTMCNC: 32.5 G/DL (ref 31–37)
MCV RBC AUTO: 88.6 FL (ref 80–100)
MONOCYTES # BLD AUTO: 0.74 X10(3) UL (ref 0.1–1)
MONOCYTES NFR BLD AUTO: 5.6 %
NEUTROPHILS # BLD AUTO: 10.96 X10 (3) UL (ref 1.5–7.7)
NEUTROPHILS # BLD AUTO: 10.96 X10(3) UL (ref 1.5–7.7)
NEUTROPHILS NFR BLD AUTO: 83.3 %
OSMOLALITY SERPL CALC.SUM OF ELEC: 288 MOSM/KG (ref 275–295)
PLATELET # BLD AUTO: 255 10(3)UL (ref 150–450)
POTASSIUM SERPL-SCNC: 3.5 MMOL/L (ref 3.5–5.1)
RBC # BLD AUTO: 3.68 X10(6)UL (ref 3.8–5.3)
SODIUM SERPL-SCNC: 139 MMOL/L (ref 136–145)
VANCOMYCIN TROUGH SERPL-MCNC: 5.2 UG/ML (ref 10–20)
WBC # BLD AUTO: 13.2 X10(3) UL (ref 4–11)

## 2019-07-22 RX ORDER — IBUPROFEN 400 MG/1
400 TABLET ORAL EVERY 6 HOURS PRN
Status: DISCONTINUED | OUTPATIENT
Start: 2019-07-22 | End: 2019-07-29

## 2019-07-22 RX ORDER — SODIUM HYPOCHLORITE 1.25 MG/ML
SOLUTION TOPICAL AS NEEDED
Status: DISCONTINUED | OUTPATIENT
Start: 2019-07-22 | End: 2019-07-24

## 2019-07-22 RX ORDER — LIDOCAINE HYDROCHLORIDE 40 MG/ML
SOLUTION TOPICAL AS NEEDED
Status: DISCONTINUED | OUTPATIENT
Start: 2019-07-22 | End: 2019-07-29

## 2019-07-22 RX ORDER — SODIUM HYPOCHLORITE 2.5 MG/ML
SOLUTION TOPICAL AS NEEDED
Status: DISCONTINUED | OUTPATIENT
Start: 2019-07-22 | End: 2019-07-24

## 2019-07-22 RX ORDER — IBUPROFEN 600 MG/1
600 TABLET ORAL EVERY 6 HOURS PRN
Status: DISCONTINUED | OUTPATIENT
Start: 2019-07-22 | End: 2019-07-29

## 2019-07-22 NOTE — PROGRESS NOTES
[facility] Dosing Service    Initial Pharmacokinetic Consult for Vancomycin Dosing     Loree Cooper is a 34year old female who is being treated for martine gangrene . Pharmacy has been asked to dose Vancomycin by Dr. Demetrice Robles.     She is allergic to codei

## 2019-07-22 NOTE — PROGRESS NOTES
Pulmonary Progress Note        NAME: Tanner Schmid - ROOM: 454/144-V - MRN: [id number] - Age: 34year old - : 1980        Last 24hrs: No events overnight, noting some substernal chest pain this AM, no aggravated by palpation or deep breathing    OBJE ARTERIALPO2, ARTERIALPCO2, ARTERIALHCO3    No results for input(s): BNP in the last 72 hours.     Invalid input(s): TROPI    TSH   Date/Time Value Ref Range Status   02/18/2019 11:29 AM 2.082 0.350 - 5.500 mIU/L Final   02/27/2018 09:52 AM 1.784 0.350 - 5.5

## 2019-07-22 NOTE — CM/SW NOTE
Patient admitted for pelvic abscess and being followed by ID and wound care team.  MSW spoke with Dr. Thomas Gonzalez. It is undetermined if the patient will have needs for IV abx. She is currently not able to have a wound vac placed.   MSW will monitor for dc plan

## 2019-07-22 NOTE — PLAN OF CARE
Alert and oriented x4. On room air while awake and placed on 2L nasal cannula with sleep. Had loose stool and placed on contact plus isolation to r/o cdiff per protocol. Has not had another stool to send at this time. Tolerating activity up to bsc.  Brad Leon Problem: METABOLIC/FLUID AND ELECTROLYTES - ADULT  Goal: Electrolytes maintained within normal limits  Description  INTERVENTIONS:  - Monitor labs and rhythm and assess patient for signs and symptoms of electrolyte imbalances  - Administer electrolyte re

## 2019-07-22 NOTE — PROGRESS NOTES
[facility] dosing service    Follow-up Pharmacokinetic Consult for Vancomycin Dosing     Chino Torres is a 34year old female who is being treated for martine's gangrene .    Patient is on day 2 of Vancomycin and is currently receiving 1.25 gm IV Q 12 ho daily while on Vancomycin to assess renal function. 4.  Pharmacy will follow and adjust as necessary. We appreciate the opportunity to assist in her care.     Cher Rudolph, PharmD  7/22/2019  11:18 AM  [address] [place] [address] Extension: [phone]

## 2019-07-22 NOTE — PLAN OF CARE
Pt received in stable condition without complaints. Notified Dr. Hortensia Licea and Dr Symone ALVARENGA about wound vac evaluation at 1100. All were able to be there as well as patients mother. Site was visualized and cleaned and packed.   Not ready for wound Vac  yet w

## 2019-07-22 NOTE — PROGRESS NOTES
INFECTIOUS DISEASE PROGRESS NOTE    Kalie Francisco Patient Status:  Inpatient    1980 MRN [id number]   [facility] 4SW-A Attending Noel Jaquez MD   Hosp Day # 2 PCP Angela Duarte ondansetron HCl (ZOFRAN) injection 4 mg, 4 mg, Intravenous, Q6H PRN  •  Metoclopramide HCl (REGLAN) injection 10 mg, 10 mg, Intravenous, Q8H PRN  •  clindamycin in D5W (CLEOCIN) premix 600mg/50ml, 600 mg, Intravenous, Q8H  •  meropenem (MERREM) 1,000 mg in RDW 12.8 12.9 13.2   NEPRELIM 19.34*  --  10.96*   WBC 22.3* 19.1* 13.2*   .0 276.0 255.0     Recent Labs   Lab 07/20/19  1403 07/20/19  1942 07/21/19  0735 07/22/19  0702   *  --  127* 101*   BUN 14  --  11 11   CREATSERUM 0.86  --  0.54* intravenous contrast material. Post contrast coronal MPR imaging was performed. Dose reduction techniques were used.  Dose information is transmitted to the [facility] [address] ([address]) which includes the Dose ASSESSMENT:    1. Necrotizing skin/ soft tissue infection R groin- imaging concerning for nec fasciitis but fascia intact at the time of surgery per Dr Meryl Lombardo  - well covered with current abx.  Typically this is an infection due to GAS, but can also be 12-Nov-2018 22:51

## 2019-07-22 NOTE — CONSULTS
[facility]  Report of Inpatient Wound Care Consultation     Agnieszka Brooks Patient Status:  Inpatient    1980 MRN [id number]   [facility] 4SW-A Attending Brian Gaston MD   Hosp Day # 2 PCP Baldomero Becker MD     [facility] 385.0  --  276.0 255.0   K 3.2* 2.9* 3.5 3.5   CREATSERUM 0.86  --  0.54*  0.54* 0.46*  0.46*   BUN 14  --  11 11   *  --  127* 101*   CA 9.6  --  7.9* 8.6   ALB 4.1  --   --   --    TP 8.3*  --   --   --        Imaging:     PROCEDURE:  CT ABDOMEN P PELVIC NODES:  No adenopathy. PELVIC ORGANS:  No visible mass. Pelvic organs appropriate for patient age. BONES:  No bony lesion or fracture. LUNG BASES:  No visible pulmonary or pleural disease.     OTHER:  Negative.       =====  CONCLUSION:  F strength Dakin's solution, order in computer for use of 1/2 strength Dakin's. If patient complaints of burning, the 1/4 strength can be used. This was discussed with RN.       Durable Medical Equipment:  See EMR  Offloading/Footwear:  See EMR  Compression:

## 2019-07-22 NOTE — PROGRESS NOTES
[facility]  Progress Note    Rosa Isela Caceres Patient Status:  Inpatient    1980 MRN [id number]   [facility] 4SW-A Attending Tor Pearson MD   Hosp Day # 2 PCP Aramis Izaguirre MD     Subjective:     Incisional pain controlled  See

## 2019-07-23 LAB
BASOPHILS # BLD AUTO: 0.03 X10(3) UL (ref 0–0.2)
BASOPHILS NFR BLD AUTO: 0.2 %
CREAT BLD-MCNC: 0.53 MG/DL (ref 0.55–1.02)
DEPRECATED RDW RBC AUTO: 44.1 FL (ref 35.1–46.3)
EOSINOPHIL # BLD AUTO: 0.23 X10(3) UL (ref 0–0.7)
EOSINOPHIL NFR BLD AUTO: 1.9 %
ERYTHROCYTE [DISTWIDTH] IN BLOOD BY AUTOMATED COUNT: 13.2 % (ref 11–15)
HCT VFR BLD AUTO: 33 % (ref 35–48)
HGB BLD-MCNC: 10.4 G/DL (ref 12–16)
IMM GRANULOCYTES # BLD AUTO: 0.06 X10(3) UL (ref 0–1)
IMM GRANULOCYTES NFR BLD: 0.5 %
LYMPHOCYTES # BLD AUTO: 1.4 X10(3) UL (ref 1–4)
LYMPHOCYTES NFR BLD AUTO: 11.6 %
MCH RBC QN AUTO: 28.7 PG (ref 26–34)
MCHC RBC AUTO-ENTMCNC: 31.5 G/DL (ref 31–37)
MCV RBC AUTO: 90.9 FL (ref 80–100)
MONOCYTES # BLD AUTO: 0.66 X10(3) UL (ref 0.1–1)
MONOCYTES NFR BLD AUTO: 5.5 %
NEUTROPHILS # BLD AUTO: 9.72 X10 (3) UL (ref 1.5–7.7)
NEUTROPHILS # BLD AUTO: 9.72 X10(3) UL (ref 1.5–7.7)
NEUTROPHILS NFR BLD AUTO: 80.3 %
PLATELET # BLD AUTO: 267 10(3)UL (ref 150–450)
POTASSIUM SERPL-SCNC: 3.6 MMOL/L (ref 3.5–5.1)
POTASSIUM SERPL-SCNC: 4 MMOL/L (ref 3.5–5.1)
RBC # BLD AUTO: 3.63 X10(6)UL (ref 3.8–5.3)
VANCOMYCIN TROUGH SERPL-MCNC: 9.8 UG/ML (ref 10–20)
WBC # BLD AUTO: 12.1 X10(3) UL (ref 4–11)

## 2019-07-23 PROCEDURE — 05H533Z INSERTION OF INFUSION DEVICE INTO RIGHT SUBCLAVIAN VEIN, PERCUTANEOUS APPROACH: ICD-10-PCS | Performed by: HOSPITALIST

## 2019-07-23 PROCEDURE — B546ZZA ULTRASONOGRAPHY OF RIGHT SUBCLAVIAN VEIN, GUIDANCE: ICD-10-PCS | Performed by: HOSPITALIST

## 2019-07-23 RX ORDER — POTASSIUM CHLORIDE 20 MEQ/1
40 TABLET, EXTENDED RELEASE ORAL EVERY 4 HOURS
Status: COMPLETED | OUTPATIENT
Start: 2019-07-23 | End: 2019-07-23

## 2019-07-23 RX ORDER — SODIUM CHLORIDE 0.9 % (FLUSH) 0.9 %
10 SYRINGE (ML) INJECTION EVERY 12 HOURS
Status: DISCONTINUED | OUTPATIENT
Start: 2019-07-23 | End: 2019-07-29

## 2019-07-23 NOTE — HOME CARE LIAISON
MET WITH PTNT AND OFFERED CHOICE  OF AGENCIES. PTNT AGREEABLE TO [facility]. MET WITH PTNT AND SPOKE WITH PTNT MOM ON THE PHONE TO [address] AND COVERAGE CRITERIA. PTNT AGREEABLE TO [facility]. PTNT GIVEN RESIDENTIAL BROCHURE.  R

## 2019-07-23 NOTE — PROGRESS NOTES
[facility] Hospitalist Progress Note     Kota Lott Patient Status:  Inpatient    1980 MRN [id number]   [facility] 3NW-A Attending Emma Gómez MD   Hosp Day # 2 PCP Radha Choudhury MD     CC: follow up    SUBJECTIVE:  Reports pain i Metoclopramide HCl, PEG 3350, magnesium hydroxide, bisacodyl, FLEET ENEMA        Assessment/Plan:     # Dejuan's gangrene  - s/p I&D 7/20  - apprec gen surg and ID recommendations  - cont rhoda, clinda, and vanco per ID (PCN allergy)  - f/u culture result

## 2019-07-23 NOTE — PLAN OF CARE
Problem: Patient/Family Goals  Goal: Patient/Family Long Term Goal  Description  Patient's Long Term Goal:      Get discharged to mom's house  Interventions:  - Eat well  -Sit in chair more  - Do I.S.  -Take pain meds as needed.   - See additional Care Pl effectiveness of ordered antiemetic medications  - Provide nonpharmacologic comfort measures as appropriate  - Advance diet as tolerated, if ordered  - Obtain nutritional consult as needed  - Evaluate fluid balance  Outcome: Progressing     Problem: METABO for signs and symptoms of bleeding or hemorrhage  - Monitor labs and vital signs for trends  - Administer supportive blood products/factors, fluids and medications as ordered and appropriate  - Administer supportive blood products/factors as ordered and ap

## 2019-07-23 NOTE — PLAN OF CARE
Patient received from ICU in stable condition. Will order dinner for patient. Resting comfortably in bed.

## 2019-07-23 NOTE — PROGRESS NOTES
[facility]  Progress Note    Ivelisse Arnold Patient Status:  Inpatient    1980 MRN [id number]   [facility] 3NW-A Attending Memo Ingram MD   Hosp Day # 3 PCP Scarlet Verduzco MD     Subjective:    Patient reports pain controlled

## 2019-07-23 NOTE — PROGRESS NOTES
Received patient alert and oriented this am  Patient deaf but able to communicate by reading lips,no need for  during shift.   NSR on monitor  Temp of 99.1 at noon, no further temps throughout day  Nguyen draining clear yellow urine  Left arm iv i

## 2019-07-23 NOTE — CDS QUERY
Clarification of Procedure – Debridement   CLINICAL DOCUMENTATION CLARIFICATION FORM  Dear Doctor    Clinical information (provided below) indicates a debridement was done.  For accurate ICD-10-PCS code assignment to reflect severity of illness and risk of

## 2019-07-23 NOTE — PROGRESS NOTES
INFECTIOUS DISEASE PROGRESS NOTE    Shane Barrera Patient Status:  Inpatient    1980 MRN [id number]   [facility] 4SW-A Attending Yesy Martines MD   Hosp Day # 3 PCP Bry Prater PRN  •  Metoclopramide HCl (REGLAN) injection 10 mg, 10 mg, Intravenous, Q8H PRN  •  clindamycin in D5W (CLEOCIN) premix 600mg/50ml, 600 mg, Intravenous, Q8H  •  meropenem (MERREM) 1,000 mg in sodium chloride 0.9% 100 mL MBP, 1,000 mg, Intravenous, Q8H  • WBC 22.3* 19.1* 13.2* 12.1*   .0 276.0 255.0 267.0     Recent Labs   Lab 07/20/19  1403  07/21/19  0735 07/22/19  0702 07/23/19  0538   *  --  127* 101*  --    BUN 14  --  11 11  --    CREATSERUM 0.86  --  0.54*  0.54* 0.46*  0.46* 0.53* TECHNIQUE:  CT scanning was performed from the dome of the diaphragm to the pubic symphysis with non-ionic intravenous contrast material. Post contrast coronal MPR imaging was performed. Dose reduction techniques were used.  Dose information is transmitted performed. Dictated by: Sp Guardado MD on 7/20/2019 at 15:49     Approved by: Sp Guardado MD             ASSESSMENT:    1.  Necrotizing skin/ soft tissue infection R groin- imaging concerning for nec fasciitis but fascia intact at the time

## 2019-07-23 NOTE — PROGRESS NOTES
[facility] Hospitalist Progress Note     Melissa Coe Patient Status:  Inpatient    1980 MRN [id number]   [facility] 3NW-A Attending Skyler Randle MD   Hosp Day # 3 PCP Gonzalo Tracy MD     CC: follow up    SUBJECTIVE:  Doing well.  Pa ibuprofen **OR** ibuprofen, Calcium Carbonate Antacid, acetaminophen **OR** HYDROcodone-acetaminophen **OR** HYDROcodone-acetaminophen, HYDROmorphone HCl **OR** HYDROmorphone HCl **OR** HYDROmorphone HCl, ondansetron HCl, Metoclopramide HCl, PEG 3350, magn

## 2019-07-23 NOTE — PLAN OF CARE
Patient's temperature 101.2, Tylenol given. Dr. Andres Spears notified of temperature of 101.2, orders received for CBC in am.  Will continue to monitor patient.

## 2019-07-24 LAB
BASOPHILS # BLD AUTO: 0.03 X10(3) UL (ref 0–0.2)
BASOPHILS NFR BLD AUTO: 0.3 %
DEPRECATED RDW RBC AUTO: 43.2 FL (ref 35.1–46.3)
EOSINOPHIL # BLD AUTO: 0.25 X10(3) UL (ref 0–0.7)
EOSINOPHIL NFR BLD AUTO: 2.7 %
ERYTHROCYTE [DISTWIDTH] IN BLOOD BY AUTOMATED COUNT: 13 % (ref 11–15)
HCT VFR BLD AUTO: 29 % (ref 35–48)
HGB BLD-MCNC: 9.3 G/DL (ref 12–16)
IMM GRANULOCYTES # BLD AUTO: 0.03 X10(3) UL (ref 0–1)
IMM GRANULOCYTES NFR BLD: 0.3 %
LYMPHOCYTES # BLD AUTO: 1.6 X10(3) UL (ref 1–4)
LYMPHOCYTES NFR BLD AUTO: 17.3 %
MCH RBC QN AUTO: 29.1 PG (ref 26–34)
MCHC RBC AUTO-ENTMCNC: 32.1 G/DL (ref 31–37)
MCV RBC AUTO: 90.6 FL (ref 80–100)
MONOCYTES # BLD AUTO: 0.61 X10(3) UL (ref 0.1–1)
MONOCYTES NFR BLD AUTO: 6.6 %
NEUTROPHILS # BLD AUTO: 6.73 X10 (3) UL (ref 1.5–7.7)
NEUTROPHILS # BLD AUTO: 6.73 X10(3) UL (ref 1.5–7.7)
NEUTROPHILS NFR BLD AUTO: 72.8 %
PLATELET # BLD AUTO: 268 10(3)UL (ref 150–450)
RBC # BLD AUTO: 3.2 X10(6)UL (ref 3.8–5.3)
WBC # BLD AUTO: 9.3 X10(3) UL (ref 4–11)

## 2019-07-24 NOTE — PROGRESS NOTES
[facility]  Progress Note    Flori Trujillo Patient Status:  Inpatient    1980 MRN [id number]   [facility] 3NW-A Attending Bisi Conway MD   [facility] Day # 4 PCP Owen Lafleur MD     Subjective:    Patient reports pain controlled

## 2019-07-24 NOTE — PLAN OF CARE
Assumed care for this pt at [address] [place] [address]. Pt alert and oriented. Pt deaf but lip reads. IV antibiotics continue. Dressing change completed. Right midline, infusing and blood withdraw and flushed. Pain only when changing dressing, relieved by pain meds.  Will contin

## 2019-07-24 NOTE — WOUND PROGRESS NOTE
[facility]  Report of Inpatient Wound Care Progress Note    Lui Kent Patient Status:  Inpatient    1980 MRN [id number]   [facility] 3NW-A Attending Yazmin Alatorre MD   Hosp Day # 4 PCP Tasia Villanueva MD       SUBJECTIVE: Follow up Imaging and Microbiology results:   See EMR    ASSESSMENT/ RECOMMENDATIONS:  Patient seen bedside. Mother present throughout. YOJANA Nuñez with Dr. Bri Crockett here to observe the wound, refer to note for details.   Dr. Caio Lopez also saw the progress:  1. Maintain optimal wound healing environment  2. Remove wound bioburden  3. Decrease periwound edema      PLAN OF CARE:   Will check seal on Thursday. Plan to re-assess on Friday with General Surgery and Infectious Disease.   Plan to see at 10:

## 2019-07-24 NOTE — CM/SW NOTE
SW noted order for [address] and hospital bed for home. Also noted that [facility] [phone] did receive referral.  [facility] richie met with pt and she agreed with plan for services through this agency.   Message sent to [facility] richie re: order for hospital bed

## 2019-07-24 NOTE — PLAN OF CARE
Problem: Patient/Family Goals  Goal: Patient/Family Long Term Goal  Description  Patient's Long Term Goal:      Get discharged to mom's house  Interventions:  - Eat well  -Sit in chair more  - Do I.S.  -Take pain meds as needed.   - See additional Care Pl Obtain nutritional consult as needed  - Evaluate fluid balance  Outcome: Progressing     Problem: METABOLIC/FLUID AND ELECTROLYTES - ADULT  Goal: Electrolytes maintained within normal limits  Description  INTERVENTIONS:  - Monitor labs and rhythm and asses Ulcer prevention bundle as indicated  Outcome: Progressing     Problem: HEMATOLOGIC - ADULT  Goal: Maintains hematologic stability  Description  INTERVENTIONS  - Assess for signs and symptoms of bleeding or hemorrhage  - Monitor labs and vital signs for tr

## 2019-07-24 NOTE — PROGRESS NOTES
[facility] dosing service    Follow-up Pharmacokinetic Consult for Vancomycin Dosing     Kalie Singh is a 34year old female who is being treated for necrotizing fascitis .    Patient is on day 4 of Vancomycin and add is currently receiving 1.25 gm IV Q hours (based on Trough of 9.8 ug/mL, pharmacokinetics, and renal function)    2. Will re-check Vancomycin trough levels prior to 3rd dose. Goal trough level 15-20 ug/mL. 3.  Pharmacy will need Scr daily while on Vancomycin to assess renal function.

## 2019-07-24 NOTE — PHYSICAL THERAPY NOTE
PHYSICAL THERAPY EVALUATION - INPATIENT     Room Number: 333/333-A  Evaluation Date: 7/24/2019  Type of Evaluation: Initial  Physician Order: PT Eval and Treat    Presenting Problem: pelvic abscess  Reason for Therapy: Mobility Dysfunction and Discha ROM and strength are within functional limits     Lower extremity ROM is within functional limits     Lower extremity strength is within functional limits     BALANCE  Static Sitting: Good  Dynamic Sitting: Good  Static Standing: Fair +  Dynamic Standing: Pt encouraged to ambulate 4x/day with nursing staff and nodded in agreement.     Exercise/Education Provided:  Bed mobility  Functional activity tolerated  Gait training  Transfer training    Patient End of Session: In bed;Needs met;Call light within reach;

## 2019-07-24 NOTE — PLAN OF CARE
Assumed care of the patient @ 07:00, resting in bed. Alert x's 4, reads lips. IV patent. Nguyen in place draining clear/yellow urine. Pain managed with Norco, ibuprofen and IV dilaudid. Tolerating general diet.  Vitals stable, ambulatory with assist. Wound v non-pharmacological measures as appropriate and evaluate response  - Consider cultural and social influences on pain and pain management  - Manage/alleviate anxiety  - Utilize distraction and/or relaxation techniques  - Monitor for opioid side effects  - N Refer to Case Management Department for coordinating discharge planning if the patient needs post-hospital services based on physician/LIP order or complex needs related to functional status, cognitive ability or social support system  Outcome: Progressing

## 2019-07-24 NOTE — PROGRESS NOTES
INFECTIOUS DISEASE PROGRESS NOTE    Flori Trujillo Patient Status:  Inpatient    1980 MRN [id number]   [facility] 4SW-A Attending Bisi Conway MD   Hosp Day # 4 PCP Colletta Pang 0.9% 100 mL MBP, 1,000 mg, Intravenous, Q8H  •  docusate sodium (COLACE) cap 100 mg, 100 mg, Oral, BID  •  PEG 3350 (MIRALAX) powder packet 17 g, 17 g, Oral, Daily PRN  •  magnesium hydroxide (MILK OF MAGNESIA) 400 MG/5ML suspension 30 mL, 30 mL, Oral, Simmie Fisher 0.46* 0.53*  --    GFRAA 98  --  137  137 144  144 137  --    GFRNAA 85  --  118  118 125  125 119  --    CA 9.6  --  7.9* 8.6  --   --    ALB 4.1  --   --   --   --   --    *  --  141 139  --   --    K 3.2*   < > 3.5 3.5 3.6 4.0     --  110 10 Necrotizing skin/ soft tissue infection R groin- imaging concerning for nec fasciitis but fascia intact at the time of surgery per Dr Houston Gonsales  - cx so far with Mariajose knox  - wound clean and s/p vac, overall she is better    2.  Sepsis due to above- res

## 2019-07-24 NOTE — CM/SW NOTE
Email received re: possible wound vac and [employer] indicating pt's insurance would use [employer]. Message left for [facility] [phone], liaison with [facility], requesting return call.

## 2019-07-24 NOTE — CM/SW NOTE
Order received from wound care re: need for f/u as outpt in the wound care clinic and due to pt's HMO, she will need referral from [address] [place] [address]. Directed by supervision to call insurance cm- spoke with [facility] and the [facility] [place].   She does not give auth for Outpt services a

## 2019-07-24 NOTE — CM/SW NOTE
Call from Thomas Davis [phone] with Rosey Kamara. Initial referral info sent via [facility]. She will f/u with SW covering tomorrow re: other info needed for wound vac.

## 2019-07-25 NOTE — PROGRESS NOTES
INFECTIOUS DISEASE PROGRESS NOTE    Vee Ackerman Patient Status:  Inpatient    1980 MRN [id number]   [facility] 4SW-A Attending Elder Castellon MD   Hosp Day # 5 PCP Jeannette Dixon (DULCOLAX) rectal suppository 10 mg, 10 mg, Rectal, Daily PRN  •  FLEET ENEMA (FLEET) 7-19 GM/118ML enema 133 mL, 1 enema, Rectal, Once PRN  •  lactated ringers infusion, , Intravenous, Continuous    Review of Systems:    Completed.  See pertinent positives 24.0 27.0  --   --    ALKPHO 113*  --   --   --   --   --    AST 8*  --   --   --   --   --    ALT 18  --   --   --   --   --    BILT 1.1  --   --   --   --   --    TP 8.3*  --   --   --   --   --     < > = values in this interval not displayed.      Recent lugdinensis and anaerobes  - wound clean and s/p vac, overall she is better    2. H/o PCN allergy- rash per chart- pt does not remember reaction    PLAN:    - cont rhoda for now (allergic to PCN).  Could eventually transition to ancef/ flagyl vs invanz on d/

## 2019-07-25 NOTE — PLAN OF CARE
Problem: Patient/Family Goals  Goal: Patient/Family Long Term Goal  Description  Patient's Long Term Goal:      Get discharged to mom's house  Interventions:  - Eat well  -Sit in chair more  - Do I.S.  -Take pain meds as needed.   - See additional Care Pl limits  Description  INTERVENTIONS:  - Monitor labs and rhythm and assess patient for signs and symptoms of electrolyte imbalances  - Administer electrolyte replacement as ordered  - Monitor response to electrolyte replacements, including rhythm and repeat symptoms of bleeding or hemorrhage  - Monitor labs and vital signs for trends  - Administer supportive blood products/factors, fluids and medications as ordered and appropriate  - Administer supportive blood products/factors as ordered and appropriate  Out period  Description  INTERVENTIONS  - Monitor WBC  - Administer growth factors as ordered  - Implement neutropenic guidelines  Outcome: Progressing     Problem: SAFETY ADULT - FALL  Goal: Free from fall injury  Description  INTERVENTIONS:  - Assess pt freq sponge in place, dressing intact, no drainage in wound vac. Will monitor.

## 2019-07-25 NOTE — PROGRESS NOTES
Wound vac in place, cultured noted. Doing better.  Will attempt to remove herrera, discharge planning on IV ABX and wound clinic

## 2019-07-25 NOTE — CM/SW NOTE
Mariya Ly from [facility] [phone] said that she was able to get the wound vac form signed by Dr Beulah Bailey so she will be able to deliver the [facility] wound vac to the hospital to pt's room tomorrow. Pt will also have [facility] for [facility].   Waiting to hear if pt will need IV A

## 2019-07-25 NOTE — HOME CARE LIAISON
TNL SPOKE WITH FERMIN WITH [facility].  BED APPROVED [employer] [phone] PTNT/FAMILY TO ARRANGE DELIVERY    THANKS  Matt Arzola

## 2019-07-25 NOTE — PROGRESS NOTES
Vss. Pt resting in bed this am. Ambulated in halls without difficulty with physical therapy this am. Pt denies n/v.reports she is passing flatus but last bm was 7/23. Abdomen rounded but soft. bsx4 present on auscultation.  Wound vac in place to right groin

## 2019-07-25 NOTE — PROGRESS NOTES
[facility] Hospitalist Progress Note     Soni Mullen Patient Status:  Inpatient    1980 MRN [id number]   [facility] 3NW-A Attending Jeanette Arias MD   Hosp Day # 5 PCP Lore Judd MD     CC: follow up    SUBJECTIVE:  Pain controlle HYDROcodone-acetaminophen, HYDROmorphone HCl **OR** HYDROmorphone HCl **OR** HYDROmorphone HCl, ondansetron HCl, Metoclopramide HCl, PEG 3350, magnesium hydroxide, bisacodyl, FLEET ENEMA        Assessment/Plan:     # Dejuan's gangrene  - s/p I&D 7/20  -

## 2019-07-25 NOTE — PROGRESS NOTES
[facility] Hospitalist Progress Note     Ivelissewalter Montanobereket Patient Status:  Inpatient    1980 MRN [id number]   [facility] 3NW-A Attending Memo Ingram MD   Hosp Day # 5 PCP Scarlet Verduzco MD     CC: follow up    SUBJECTIVE:  Pain controlle HYDROcodone-acetaminophen, HYDROmorphone HCl **OR** HYDROmorphone HCl **OR** HYDROmorphone HCl, ondansetron HCl, Metoclopramide HCl, PEG 3350, magnesium hydroxide, bisacodyl, FLEET ENEMA        Assessment/Plan:     # Dejuan's gangrene  - s/p I&D 7/20  -

## 2019-07-25 NOTE — CM/SW NOTE
SHAE received a call from [facility] from [place] [phone]. They are out of network. In the event patient needs ABX (not yet determined) [facility] cannot provide them.

## 2019-07-25 NOTE — PHYSICAL THERAPY NOTE
PHYSICAL THERAPY TREATMENT NOTE - INPATIENT    Room Number: 333/333-A     Session: 1   Number of Visits to Meet Established Goals: 2    Presenting Problem: pelvic abscess    Problem List  Principal Problem:    Pelvic abscess in female  Active Problems: steps with a railing?: A Little       AM-PAC Score:  Raw Score: 23   Approx Degree of Impairment: 11.2%   Standardized Score (AM-PAC Scale): 56.93   CMS Modifier (G-Code): CI    FUNCTIONAL ABILITY STATUS  Gait Assessment   Gait Assistance: Modified indepen is able to demonstrate supine - sit EOB @ level: independent      Goal #2 Patient is able to demonstrate transfers Sit to/from Stand at assistance level: independent      Goal #3 Patient is able to ambulate 250 feet with assist device: walker - rolling at

## 2019-07-26 RX ORDER — HYDROCODONE BITARTRATE AND ACETAMINOPHEN 5; 325 MG/1; MG/1
1 TABLET ORAL EVERY 4 HOURS PRN
Qty: 30 TABLET | Refills: 0 | Status: SHIPPED | OUTPATIENT
Start: 2019-07-26 | End: 2019-08-05

## 2019-07-26 NOTE — PROGRESS NOTES
[facility] Hospitalist Progress Note     Rolly Cantu Patient Status:  Inpatient    1980 MRN [id number]   [facility] 3NW-A Attending Josette Smith MD   [facility] Day # 6 PCP Lj Cruz MD     CC: follow up    SUBJECTIVE:  Pain controlle HYDROcodone-acetaminophen, HYDROmorphone HCl **OR** HYDROmorphone HCl **OR** HYDROmorphone HCl, ondansetron HCl, Metoclopramide HCl, PEG 3350, magnesium hydroxide, bisacodyl, FLEET ENEMA        Assessment/Plan:     # Dejuan's gangrene  - s/p I&D 7/20  -

## 2019-07-26 NOTE — PROGRESS NOTES
[facility]  Progress Note    Earleen Mode Patient Status:  Inpatient    1980 MRN [id number]   [facility] 3NW-A Attending Diamond Mcclendon MD   [facility] Day # 6 PCP Didier Schilling MD     Subjective:    Patient denies any new complain

## 2019-07-26 NOTE — CM/SW NOTE
Katelyn from [address] [place] [address] delivered the wound vac to pt's room. Still waiting to hear about IV ABX from ID. Pt plans on seeing her PCP before she goes to the wound clinic. Hospital bed was approved and will be delivered to pt's home upon d/c.  SW following.

## 2019-07-26 NOTE — WOUND PROGRESS NOTE
[facility]  Report of Inpatient Wound Care Progress Note    Santiago Woods Patient Status:  Inpatient    1980 MRN [id number]   [facility] 3NW-A Attending Reilly Ambrose MD   Hosp Day # 6 PCP Lord Mickey MD       SUBJECTIVE: interval not displayed. Follow up Imaging and Microbiology results:   See EMR    ASSESSMENT/ RECOMMENDATIONS:  Patient seen bedside with YOJANA Mchugh with Dr. Pradeep Richardson.       Upon removal of the dressing, the wound demonstrates ingrowth of granulat progress:  1. Maintain optimal wound healing environment  2. Remove wound bioburden  3.  Decrease periwound edema      PLAN OF CARE:   Will see again on Monday with General Surgery and Infectious Disease, will try to see in am, around 10:00 am if that works

## 2019-07-26 NOTE — PROGRESS NOTES
INFECTIOUS DISEASE PROGRESS NOTE    Sandy Khan Patient Status:  Inpatient    1980 MRN [id number]   [facility] 4SW-A Attending Crystal Souza MD   Hosp Day # 6 PCP Inna Lechuga mg, 10 mg, Rectal, Daily PRN  •  FLEET ENEMA (FLEET) 7-19 GM/118ML enema 133 mL, 1 enema, Rectal, Once PRN  •  lactated ringers infusion, , Intravenous, Continuous    Review of Systems:    Completed.  See pertinent positives and negatives above    Physical 113*  --   --   --   --   --    AST 8*  --   --   --   --   --    ALT 18  --   --   --   --   --    BILT 1.1  --   --   --   --   --    TP 8.3*  --   --   --   --   --     < > = values in this interval not displayed.      Recent Labs   Lab 07/20/19  4422 s/p vac, overall she is better    2. H/o PCN allergy- rash per chart- pt does not remember reaction    PLAN:    - cont rhoda for now (allergic to PCN).  Will transition to invanz on d/c  - Wound RN will exchange vac today   - if wound clean today, may be abl

## 2019-07-26 NOTE — DIETARY NOTE
Isabel 54     Admitting diagnosis:  Pelvic abscess in female [N73.9]    Ht:  5'4\"  Wt: 135.2 kg (298 lb). This is 248 % of IBW  Body mass index is 51.15 kg/m².   IBW: 54.5 kg    Labs/Meds reviewed    Diet: Orders

## 2019-07-26 NOTE — PLAN OF CARE
Problem: Patient/Family Goals  Goal: Patient/Family Long Term Goal  Description  Patient's Long Term Goal:      Get discharged to mom's house  Interventions:  - Eat well  -Sit in chair more  - Do I.S.  -Take pain meds as needed.   - See additional Care Pl as tolerated, if ordered  - Obtain nutritional consult as needed  - Evaluate fluid balance  Outcome: Progressing     Problem: METABOLIC/FLUID AND ELECTROLYTES - ADULT  Goal: Electrolytes maintained within normal limits  Description  INTERVENTIONS:  - Monit appropriate  - Initiate Pressure Ulcer prevention bundle as indicated  Outcome: Progressing     Problem: HEMATOLOGIC - ADULT  Goal: Maintains hematologic stability  Description  INTERVENTIONS  - Assess for signs and symptoms of bleeding or hemorrhage  - Mo reports new pain  - Anticipate increased pain with activity and pre-medicate as appropriate  Outcome: Progressing     Problem: RISK FOR INFECTION - ADULT  Goal: Absence of fever/infection during anticipated neutropenic period  Description  INTERVENTIONS  - time. Tolerating diet, with no reports of nausea. RA, with pulse ox in place. Tele monitor in place. Voiding freely and without difficulty. Wound vac intact. Due to be changed in AM. IV antibiotics infusing into right midline.  Patient up with minimal priya

## 2019-07-26 NOTE — CM/SW NOTE
IV ABX order received. Referral made to [facility] via [address] - waiting for a response.

## 2019-07-27 NOTE — PLAN OF CARE
Problem: Patient/Family Goals  Goal: Patient/Family Long Term Goal  Description  Patient's Long Term Goal:      Get discharged to mom's house  Interventions:  - Eat well  -Sit in chair more  - Do I.S.  -Take pain meds as needed.   - See additional Care Pl limits  Description  INTERVENTIONS:  - Monitor labs and rhythm and assess patient for signs and symptoms of electrolyte imbalances  - Administer electrolyte replacement as ordered  - Monitor response to electrolyte replacements, including rhythm and repeat symptoms of bleeding or hemorrhage  - Monitor labs and vital signs for trends  - Administer supportive blood products/factors, fluids and medications as ordered and appropriate  - Administer supportive blood products/factors as ordered and appropriate  Out period  Description  INTERVENTIONS  - Monitor WBC  - Administer growth factors as ordered  - Implement neutropenic guidelines  Outcome: Progressing     Problem: SAFETY ADULT - FALL  Goal: Free from fall injury  Description  INTERVENTIONS:  - Assess pt freq Midline does not draw back blood, attempted to reposition and use tourniquet does not work.

## 2019-07-27 NOTE — CM/SW NOTE
Spoke with Alfred Tate at [facility] [phone] requesting update on d/c. Per RN pt will be here thru weekend. Alfred Tate also provided pt's out of pocket cost per week would be $358.  SW was unable to share cost with pt at this time but will defer to Monday ALEJANDRA

## 2019-07-27 NOTE — PROGRESS NOTES
[facility] Hospitalist Progress Note     Tanner Schmid Patient Status:  Inpatient    1980 MRN [id number]   [facility] 3NW-A Attending Lynsey Hess MD   [address] Day # 7 PCP Graciela Durant MD     CC: follow up    SUBJECTIVE:  Sitting in fabrice **OR** HYDROcodone-acetaminophen, HYDROmorphone HCl **OR** HYDROmorphone HCl **OR** HYDROmorphone HCl, ondansetron HCl, Metoclopramide HCl, PEG 3350, magnesium hydroxide, bisacodyl, FLEET ENEMA        Assessment/Plan:     # Dejuan's gangrene  - s/p I&D 7

## 2019-07-27 NOTE — PLAN OF CARE
Pt has been stable, with adequate pain control, afebrile. Up to bathroom with standby assistance, able to void without breaking seal of wound vac. Dressing intact, small amount of serosanguineous drainage being suctioned.  Good appetite, able to communicate

## 2019-07-27 NOTE — PLAN OF CARE
Pt is deaf but able to communicate reading lips and and answers questions verbally. She has been afebrile, VS stable. Complex dressing change done this afternoon, pt tolerated well but required several doses of dilaudid IV for pain control.  She is now comf

## 2019-07-28 RX ORDER — FLUTICASONE PROPIONATE 50 MCG
2 SPRAY, SUSPENSION (ML) NASAL DAILY
Status: DISCONTINUED | OUTPATIENT
Start: 2019-07-28 | End: 2019-07-29

## 2019-07-28 NOTE — PLAN OF CARE
Attempted to flush midline IV prior to antibiotic administration. Much resistance felt & unable to flush. Site assessed, dressing changed. Still unable to flush. Midline IV dc'd with tip intact. Pressure placed on site.  22g IV started in left forearm, tole

## 2019-07-28 NOTE — PLAN OF CARE
Problem: Patient/Family Goals  Goal: Patient/Family Long Term Goal  Description  Patient's Long Term Goal:      Get discharged to mom's house  Interventions:  - Eat well  -Sit in chair more  - Do I.S.  -Take pain meds as needed.   - See additional Care Pl measures as appropriate  - Advance diet as tolerated, if ordered  - Obtain nutritional consult as needed  - Evaluate fluid balance  Outcome: Progressing     Problem: METABOLIC/FLUID AND ELECTROLYTES - ADULT  Goal: Electrolytes maintained within normal limi orders  - Initiate isolation precautions as appropriate  - Initiate Pressure Ulcer prevention bundle as indicated  Outcome: Progressing     Problem: HEMATOLOGIC - ADULT  Goal: Maintains hematologic stability  Description  INTERVENTIONS  - Assess for signs if interventions unsuccessful or patient reports new pain  - Anticipate increased pain with activity and pre-medicate as appropriate  Outcome: Progressing     Problem: RISK FOR INFECTION - ADULT  Goal: Absence of fever/infection during anticipated neutrope alert and oriented x4. Patient is deaf but reads lips. Wound vac in place. Patient careful to void and not get urine on wound vac. Merrem given per Mar. Midline does not draw back blood. Norco given x1 for pain. Tolerating a regular diet. VS stable.  Sharon More

## 2019-07-28 NOTE — PROGRESS NOTES
[facility] Hospitalist Progress Note     Eusebio Guido Patient Status:  Inpatient    1980 MRN [id number]   [facility] 3NW-A Attending Prakash Delacruz MD   [address] Day # 8 PCP Lulu South MD     CC: follow up    SUBJECTIVE:  Sitting in bed Dejuan's gangrene  - s/p I&D 7/20  - apprec gen surg and ID recommendations  - cont abx per ID, on rhoda  - plan dc w Invanz on dc  - f/u culture results: tissue cx: 1+ growth Staph lugdunensis  - need to arrange wound care at home    # Groin/buttock post

## 2019-07-29 VITALS
DIASTOLIC BLOOD PRESSURE: 78 MMHG | TEMPERATURE: 99 F | BODY MASS INDEX: 50 KG/M2 | RESPIRATION RATE: 18 BRPM | SYSTOLIC BLOOD PRESSURE: 132 MMHG | HEART RATE: 68 BPM | OXYGEN SATURATION: 94 % | WEIGHT: 291.63 LBS

## 2019-07-29 LAB
ANION GAP SERPL CALC-SCNC: 6 MMOL/L (ref 0–18)
BUN BLD-MCNC: 5 MG/DL (ref 7–18)
BUN/CREAT SERPL: 11.1 (ref 10–20)
CALCIUM BLD-MCNC: 9.4 MG/DL (ref 8.5–10.1)
CHLORIDE SERPL-SCNC: 107 MMOL/L (ref 98–112)
CO2 SERPL-SCNC: 26 MMOL/L (ref 21–32)
CREAT BLD-MCNC: 0.45 MG/DL (ref 0.55–1.02)
DEPRECATED RDW RBC AUTO: 42.4 FL (ref 35.1–46.3)
ERYTHROCYTE [DISTWIDTH] IN BLOOD BY AUTOMATED COUNT: 13 % (ref 11–15)
GLUCOSE BLD-MCNC: 105 MG/DL (ref 70–99)
HAV IGM SER QL: 2.3 MG/DL (ref 1.6–2.6)
HCT VFR BLD AUTO: 41.1 % (ref 35–48)
HGB BLD-MCNC: 13.1 G/DL (ref 12–16)
MCH RBC QN AUTO: 28.2 PG (ref 26–34)
MCHC RBC AUTO-ENTMCNC: 31.9 G/DL (ref 31–37)
MCV RBC AUTO: 88.6 FL (ref 80–100)
OSMOLALITY SERPL CALC.SUM OF ELEC: 286 MOSM/KG (ref 275–295)
PLATELET # BLD AUTO: 493 10(3)UL (ref 150–450)
POTASSIUM SERPL-SCNC: 3.8 MMOL/L (ref 3.5–5.1)
RBC # BLD AUTO: 4.64 X10(6)UL (ref 3.8–5.3)
SODIUM SERPL-SCNC: 139 MMOL/L (ref 136–145)
WBC # BLD AUTO: 10.2 X10(3) UL (ref 4–11)

## 2019-07-29 PROCEDURE — B547ZZA ULTRASONOGRAPHY OF LEFT SUBCLAVIAN VEIN, GUIDANCE: ICD-10-PCS | Performed by: HOSPITALIST

## 2019-07-29 PROCEDURE — 05H633Z INSERTION OF INFUSION DEVICE INTO LEFT SUBCLAVIAN VEIN, PERCUTANEOUS APPROACH: ICD-10-PCS | Performed by: HOSPITALIST

## 2019-07-29 RX ORDER — METRONIDAZOLE 500 MG/1
500 TABLET ORAL EVERY 8 HOURS SCHEDULED
Qty: 42 TABLET | Refills: 0 | Status: SHIPPED | OUTPATIENT
Start: 2019-07-29 | End: 2019-08-08

## 2019-07-29 RX ORDER — SODIUM CHLORIDE 0.9 % (FLUSH) 0.9 %
10 SYRINGE (ML) INJECTION EVERY 12 HOURS
Status: DISCONTINUED | OUTPATIENT
Start: 2019-07-29 | End: 2019-07-29

## 2019-07-29 RX ORDER — POTASSIUM CHLORIDE 20 MEQ/1
40 TABLET, EXTENDED RELEASE ORAL ONCE
Status: COMPLETED | OUTPATIENT
Start: 2019-07-29 | End: 2019-07-29

## 2019-07-29 RX ORDER — METRONIDAZOLE 500 MG/1
500 TABLET ORAL EVERY 8 HOURS SCHEDULED
Status: DISCONTINUED | OUTPATIENT
Start: 2019-07-29 | End: 2019-07-29

## 2019-07-29 NOTE — PLAN OF CARE
Problem: CARDIOVASCULAR - ADULT  Goal: Maintains optimal cardiac output and hemodynamic stability  Description  INTERVENTIONS:  - Monitor vital signs, rhythm, and trends  - Monitor for bleeding, hypotension and signs of decreased cardiac output  - Evalua pressure ulcer development  - Assess and document skin integrity  - Assess and document dressing/incision, wound bed, drain sites and surrounding tissue  - Implement wound care per orders  - Initiate isolation precautions as appropriate  - Initiate Pressur and social influences on pain and pain management  - Manage/alleviate anxiety  - Utilize distraction and/or relaxation techniques  - Monitor for opioid side effects  - Notify MD/LIP if interventions unsuccessful or patient reports new pain  - Anticipate in

## 2019-07-29 NOTE — CM/SW NOTE
Pt cannot afford to pay for the IV Invanz per Edie Terrell at [facility]3] with Dr Theron Umanzor who said she can change the IV ABX to IV Rocephin which Edie Cam from [place] priced to be $43/wk for the drug as [facility] will pay for the supplies.   Pt will be given a dose befor

## 2019-07-29 NOTE — PROGRESS NOTES
1319[facility] Pablo Epley ARRIVED TO SEE PATIENT.

## 2019-07-29 NOTE — PROGRESS NOTES
I was present and supervised Vale Benites PTA. Notes were reviewed and care agreed upon.     NURSING DISCHARGE NOTE    Discharged Home via Wheelchair. Accompanied by Spouse  Belongings Taken by patient/family. PATIENT DISCHARGED HOME WITH HOME HEALTH IN STABLE CONDITION.  PATIENT LEFT THE FLOOR VIA WHEELCHAIR AND WAS ACCOMPANIED BY HER HUSB

## 2019-07-29 NOTE — PROGRESS NOTES
[facility]  Progress Note    Tanner Schmid Patient Status:  Inpatient    1980 MRN [id number]   [facility] 3NW-A Attending Lynsey Hess MD   [facility] Day # 9 PCP Graciela Durant MD     Subjective:    Patient reports pain controlled

## 2019-07-29 NOTE — WOUND PROGRESS NOTE
[facility]  Report of Inpatient Wound Care Progress Note    Judy Valderrama Patient Status:  Inpatient    1980 MRN [id number]   [facility] 3NW-A Attending Melani Canales MD   [address] Day # 9 PCP Mary Alice Clayton MD       SUBJECTIVE: with 1/4 strength Dakin's solution. Upon close observation of the tunnels, no new tunnels noted, no purulence noted. Placed 1 large piece coiled piece of black foam into the wound, with small piece from the main piece into each tunnel.   Alchol, mastiso

## 2019-07-29 NOTE — PLAN OF CARE
Patient denies pain/n/v. Wound vac intact, draining scant sanguinous drainage. Voiding freely. +bm/+flatus per patient. VSS. Safety maintained. Patient to have line placed today.

## 2019-07-29 NOTE — PROGRESS NOTES
[facility] Hospitalist Progress Note     Jack Bell Patient Status:  Inpatient    1980 MRN [id number]   [facility] 3NW-A Attending Cecile Rogel MD   [facility] Day # 9 PCP Shantelle Jordan MD     CC: follow up    SUBJECTIVE:  Standing, feel ondansetron HCl, Metoclopramide HCl, PEG 3350, magnesium hydroxide, bisacodyl, FLEET ENEMA        Assessment/Plan:     # Dejuan's gangrene  - s/p I&D 7/20  - apprec gen surg and ID recommendations  - cont abx per ID, on rhoda  - plan was to dc w Invanz on

## 2019-07-29 NOTE — PLAN OF CARE
Pt c/o nasal congestion, requesting Flonase.  Dr Emmanuel Helm contacted re above & also notified that Midline IV occluded, [id number] for [facility] & order for replacement of midline by Vascular Access Team in am.

## 2019-07-29 NOTE — PROGRESS NOTES
INFECTIOUS DISEASE PROGRESS NOTE    Ivelisse Montanobereket Patient Status:  Inpatient    1980 MRN [id number]   [facility] 4SW-A Attending Memo Ingram MD   [facility] Day # 9 PCP Jay Botello (MILK OF MAGNESIA) 400 MG/5ML suspension 30 mL, 30 mL, Oral, Daily PRN  •  bisacodyl (DULCOLAX) rectal suppository 10 mg, 10 mg, Rectal, Daily PRN  •  FLEET ENEMA (FLEET) 7-19 GM/118ML enema 133 mL, 1 enema, Rectal, Once PRN  •  lactated ringers infusion, NITRITE, LEUUR, WBCUR, RBCUR, BACUR, EPIUR in the last 168 hours. Microbiology    Reviewed in EMR,  Hospital Encounter on 07/20/19   1.  TISSUE AEROBIC CULTURE     Status: Abnormal    Collection Time: 07/20/19  5:52 PM   Result Value Ref Range    Tissu f/u with me in 2 weeks after d/c    Isis M [address]    Family not able to afford invanz. Option of CTX or ancef + flagyl given. CTX seems to be the most affordable. Will give one dose before d/c. D/w .     Donna Brandon MD

## 2019-08-02 NOTE — DISCHARGE SUMMARY
[facility]  Discharge Summary    Elaine Guevara Patient Status:  Inpatient    1980 MRN [id number]   [facility] 3NW-A Attending No att. providers found   [facility] Day # 9 PCP Annette Stone MD     Date of Admission: 2019    Date DAILY, Normal, Disp-3 Bottle, R-0    Naproxen Sodium (ALEVE) 220 MG Oral Tab  Take 1-2 tablets (220-440 mg total) by mouth as needed., Normal, Disp-30 tablet, R-prn    ibuprofen 800 MG Oral Tab  TK 1 T PO Q 8 H, Historical, R-0    ammonium lactate 12 % Ext

## 2019-08-05 ENCOUNTER — LAB REQUISITION (OUTPATIENT)
Dept: LAB | Facility: HOSPITAL | Age: 39
End: 2019-08-05
Payer: MEDICARE

## 2019-08-05 DIAGNOSIS — R69 ILLNESS: ICD-10-CM

## 2019-08-05 LAB
ANION GAP SERPL CALC-SCNC: 6 MMOL/L (ref 0–18)
BASOPHILS # BLD AUTO: 0.07 X10(3) UL (ref 0–0.2)
BASOPHILS NFR BLD AUTO: 1.3 %
BUN BLD-MCNC: 17 MG/DL (ref 7–18)
BUN/CREAT SERPL: 27.4 (ref 10–20)
CALCIUM BLD-MCNC: 8.7 MG/DL (ref 8.5–10.1)
CHLORIDE SERPL-SCNC: 106 MMOL/L (ref 98–112)
CO2 SERPL-SCNC: 26 MMOL/L (ref 21–32)
CREAT BLD-MCNC: 0.62 MG/DL (ref 0.55–1.02)
CRP SERPL-MCNC: <0.29 MG/DL (ref ?–0.3)
DEPRECATED RDW RBC AUTO: 44 FL (ref 35.1–46.3)
EOSINOPHIL # BLD AUTO: 0.29 X10(3) UL (ref 0–0.7)
EOSINOPHIL NFR BLD AUTO: 5.2 %
ERYTHROCYTE [DISTWIDTH] IN BLOOD BY AUTOMATED COUNT: 13.6 % (ref 11–15)
GLUCOSE BLD-MCNC: 91 MG/DL (ref 70–99)
HCT VFR BLD AUTO: 41 % (ref 35–48)
HGB BLD-MCNC: 13.5 G/DL (ref 12–16)
IMM GRANULOCYTES # BLD AUTO: 0.05 X10(3) UL (ref 0–1)
IMM GRANULOCYTES NFR BLD: 0.9 %
LYMPHOCYTES # BLD AUTO: 1.16 X10(3) UL (ref 1–4)
LYMPHOCYTES NFR BLD AUTO: 20.8 %
MCH RBC QN AUTO: 29.2 PG (ref 26–34)
MCHC RBC AUTO-ENTMCNC: 32.9 G/DL (ref 31–37)
MCV RBC AUTO: 88.6 FL (ref 80–100)
MONOCYTES # BLD AUTO: 0.66 X10(3) UL (ref 0.1–1)
MONOCYTES NFR BLD AUTO: 11.8 %
NEUTROPHILS # BLD AUTO: 3.36 X10 (3) UL (ref 1.5–7.7)
NEUTROPHILS # BLD AUTO: 3.36 X10(3) UL (ref 1.5–7.7)
NEUTROPHILS NFR BLD AUTO: 60 %
OSMOLALITY SERPL CALC.SUM OF ELEC: 287 MOSM/KG (ref 275–295)
PLATELET # BLD AUTO: 378 10(3)UL (ref 150–450)
POTASSIUM SERPL-SCNC: 3.9 MMOL/L (ref 3.5–5.1)
RBC # BLD AUTO: 4.63 X10(6)UL (ref 3.8–5.3)
SODIUM SERPL-SCNC: 138 MMOL/L (ref 136–145)
WBC # BLD AUTO: 5.6 X10(3) UL (ref 4–11)

## 2019-08-05 PROCEDURE — 86140 C-REACTIVE PROTEIN: CPT | Performed by: SURGERY

## 2019-08-05 PROCEDURE — 80048 BASIC METABOLIC PNL TOTAL CA: CPT | Performed by: SURGERY

## 2019-08-05 PROCEDURE — 85025 COMPLETE CBC W/AUTO DIFF WBC: CPT | Performed by: SURGERY

## 2019-08-05 NOTE — CDS QUERY
Uncertain Diagnosis  CLINICAL DOCUMENTATION CLARIFICATION FORM  Dear Doctor:  Clinical information (provided below) indicates an unknown status of a documented diagnosis.  For accurate ICD-10-CM code assignment to reflect severity of illness and risk of m MEDICAL RECORD

## 2019-08-05 NOTE — CDS QUERY
Clarification of Procedure – Debridement   Mckayla Bokchito    Dear Doctor    Clinical information (provided below) indicates a debridement was done.  For accurate ICD-10-PCS code assignment to reflect severity of illness and

## 2019-08-09 ENCOUNTER — OFFICE VISIT (OUTPATIENT)
Dept: WOUND CARE | Facility: HOSPITAL | Age: 39
End: 2019-08-09
Attending: NURSE PRACTITIONER
Payer: MEDICARE

## 2019-08-09 DIAGNOSIS — L98.492 NON-PRESSURE CHRONIC ULCER OF SKIN OF OTHER SITES WITH FAT LAYER EXPOSED (HCC): ICD-10-CM

## 2019-08-09 DIAGNOSIS — T81.31XD DISRUPTION OF EXTERNAL OPERATION (SURGICAL) WOUND, NOT ELSEWHERE CLASSIFIED, SUBSEQUENT ENCOUNTER: Primary | ICD-10-CM

## 2019-08-09 PROCEDURE — 99214 OFFICE O/P EST MOD 30 MIN: CPT

## 2019-08-09 NOTE — PROGRESS NOTES
Subjective    Chief Complaint  This information was obtained from the patient  Patient is here for an initial exam for a right labia wound which is being treated with a wound vac.  Patient has [facility] but would like to discontinue HH and come to the Disease - Father, Hypertension - Father, Kidney Disease - No History, Lung Disease - No History, Mental Illness - No History, Stroke - No History, Thyroid Problems - Mother, Maternal Grandparents, Tuberculosis - No History, Seizures - No History, Autoimmun daily  fluticasone propionate - nasal 50 mcg/actuation 2 spray,suspension twice daily  ibuprofen - oral 800 mg tablet once daily  naproxen - oral 250 mg tablet other  Dakin's Solution - miscellaneous 0.125 % solution twice daily for 14 days for wound infection. Psychiatric:  Judgment and insight intact. Alert and oriented times 3. No evidence of depression, anxiety, or agitation. Calm, cooperative, and communicative. Appropriate interactions and affect.         Assessment    Active Problems    ICD-10 vegetables, orange or yellow vegetables, cantaloupe, fortified dairy products, liver, fortified cereals  Zinc: Fortified cereals, red meats, seafood    S/S of Infection    Care summary  Reviewed and evaluated labs.  - Aug 2019 bun 17, creat 0.62, [id number]

## 2019-08-16 ENCOUNTER — OFFICE VISIT (OUTPATIENT)
Dept: WOUND CARE | Facility: HOSPITAL | Age: 39
End: 2019-08-16
Attending: NURSE PRACTITIONER
Payer: MEDICARE

## 2019-08-16 DIAGNOSIS — L98.492 NON-PRESSURE CHRONIC ULCER OF SKIN OF OTHER SITES WITH FAT LAYER EXPOSED (HCC): ICD-10-CM

## 2019-08-16 DIAGNOSIS — T81.31XD DISRUPTION OF EXTERNAL OPERATION (SURGICAL) WOUND, NOT ELSEWHERE CLASSIFIED, SUBSEQUENT ENCOUNTER: Primary | ICD-10-CM

## 2019-08-16 PROCEDURE — 99213 OFFICE O/P EST LOW 20 MIN: CPT

## 2019-08-16 NOTE — PROGRESS NOTES
Subjective    Chief Complaint  This information was obtained from the patient  Patient is here for an follow up visit. Denies pain at this time.     Allergies  Codeine (Reaction: RASH), Penicillins (Reaction: RASH), Flagyl (Reaction: rash), Rocephin ([employer] (ROS)  This information was obtained from the patient, caregiver and chart. Complaints and Symptoms  Patient complains of:   Ear/Nose/Mouth/Throat: Other (congenital deafness-uses ASl)  Integumentary (Hair/Skin/Nails):  Other (s/p debridement fou Friable, Rash, Erythema. The temperature of the periwound skin is Warm. Periwound skin does not exhibit signs or symptoms of infection. Psychiatric:  Judgment and insight intact. Alert and oriented times 3.  No evidence of depression, anxiety, or agitati Fortified cereals, red meats, seafood    S/S of Infection    Care summary  Reviewed and evaluated labs. - Aug 2019 bun 17, creat 0.62, [id number]    Wound type: - martine's  Wound improving. No s/s of infection.    Start antibiotics as prescribed: - Dr. Edita Au

## 2019-08-23 ENCOUNTER — OFFICE VISIT (OUTPATIENT)
Dept: WOUND CARE | Facility: HOSPITAL | Age: 39
End: 2019-08-23
Attending: NURSE PRACTITIONER
Payer: MEDICARE

## 2019-08-23 DIAGNOSIS — L98.492 NON-PRESSURE CHRONIC ULCER OF SKIN OF OTHER SITES WITH FAT LAYER EXPOSED (HCC): ICD-10-CM

## 2019-08-23 DIAGNOSIS — T81.31XD DISRUPTION OF EXTERNAL OPERATION (SURGICAL) WOUND, NOT ELSEWHERE CLASSIFIED, SUBSEQUENT ENCOUNTER: Primary | ICD-10-CM

## 2019-08-23 PROCEDURE — 99213 OFFICE O/P EST LOW 20 MIN: CPT

## 2019-08-23 NOTE — PROGRESS NOTES
Subjective    Chief Complaint  This information was obtained from the patient  Patient is here for an follow up visit. Denies pain at this time.     Allergies  Codeine (Reaction: RASH), Penicillins (Reaction: RASH), Flagyl (Reaction: rash), Rocephin (Reacti returns today, she did get the [address] filled and has been using that, she has no c/o pain, no acute s/s of infection, wound has improved again    Review of Systems (ROS)  This information was obtained from the patient, caregiver and chart.     Complaints periwound skin exhibited: Moist. The periwound skin did not exhibit: Induration, Friable, Rash, Erythema. The temperature of the periwound skin is Warm. Periwound skin does not exhibit signs or symptoms of infection.     Psychiatric:  Judgment and insight i vegetables, cantaloupe, fortified dairy products, liver, fortified cereals  Zinc: Fortified cereals, red meats, seafood    S/S of Infection    Care summary  Reviewed and evaluated labs.  - Aug 2019 bun 17, creat 0.62, [id number]    Wound type: - adrian Reyna

## 2019-08-30 ENCOUNTER — OFFICE VISIT (OUTPATIENT)
Dept: WOUND CARE | Facility: HOSPITAL | Age: 39
End: 2019-08-30
Attending: NURSE PRACTITIONER
Payer: MEDICARE

## 2019-08-30 DIAGNOSIS — L98.492 NON-PRESSURE CHRONIC ULCER OF SKIN OF OTHER SITES WITH FAT LAYER EXPOSED (HCC): ICD-10-CM

## 2019-08-30 DIAGNOSIS — T81.31XD DISRUPTION OF EXTERNAL OPERATION (SURGICAL) WOUND, NOT ELSEWHERE CLASSIFIED, SUBSEQUENT ENCOUNTER: Primary | ICD-10-CM

## 2019-08-30 PROCEDURE — 99213 OFFICE O/P EST LOW 20 MIN: CPT

## 2019-08-30 NOTE — PROGRESS NOTES
Subjective    Chief Complaint  This information was obtained from the patient  Patient is here for a wound care follow up. She feels that she is healed, and denies any new concerns.     Allergies  Codeine (Reaction: RASH), Penicillins (Reaction: RASH), Flag infection.  no c/o pain    8-23-19 patient returns today, she did get the bactroban filled and has been using that, she has no c/o pain, no acute s/s of infection, wound has improved again    8-30-19 patient returns today, the wound is healed, we discussed been noted. There was no drainage noted. The patient reports a wound pain of level 0/10. The wound margin is flat and intact. Wound bed has % epithelialization.    The periwound skin texture is normal. The periwound skin color is normal. The periwound

## 2020-01-04 ENCOUNTER — HOSPITAL ENCOUNTER (OUTPATIENT)
Age: 40
Discharge: HOME | End: 2020-01-04
Payer: MEDICARE

## 2020-01-04 ENCOUNTER — APPOINTMENT (OUTPATIENT)
Dept: GENERAL RADIOLOGY | Age: 40
End: 2020-01-04
Payer: MEDICARE

## 2020-01-04 VITALS
DIASTOLIC BLOOD PRESSURE: 99 MMHG | HEIGHT: 64 IN | BODY MASS INDEX: 48.65 KG/M2 | WEIGHT: 285 LBS | RESPIRATION RATE: 18 BRPM | TEMPERATURE: 98 F | HEART RATE: 81 BPM | OXYGEN SATURATION: 99 % | SYSTOLIC BLOOD PRESSURE: 154 MMHG

## 2020-01-04 DIAGNOSIS — R07.89 RIGHT-SIDED CHEST WALL PAIN: Primary | ICD-10-CM

## 2020-01-04 PROCEDURE — 99213 OFFICE O/P EST LOW 20 MIN: CPT

## 2020-01-04 PROCEDURE — 71101 X-RAY EXAM UNILAT RIBS/CHEST: CPT

## 2020-01-04 RX ORDER — IBUPROFEN 600 MG/1
600 TABLET ORAL EVERY 8 HOURS PRN
Qty: 21 TABLET | Refills: 0 | Status: SHIPPED | OUTPATIENT
Start: 2020-01-04 | End: 2020-01-11

## 2020-01-04 NOTE — ED PROVIDER NOTES
Patient Seen in: [facility] In [place]      History   Patient presents with:  Pain    Stated Complaint: RIGHT SIDE RIB PAIN X 2 WKS/PT IS DEAF    HPI  38-year-old female hard on hearing, lip reads presents with right-sided rib pain for 2 t distress. Appearance: She is well-developed. HENT:      Head: Normocephalic and atraumatic. Right Ear: External ear normal.      Left Ear: External ear normal.      Nose: Nose normal.      Mouth/Throat:      Pharynx: No oropharyngeal exudate. Normal. PLEURA:  Normal. BONES:  No acute right rib fracture. CONCLUSION:  Negative chest and right rib views.     Dictated by: Jorge Hogan MD on 1/04/2020 at 15:44     Approved by: Jorge Hogan MD on 1/04/2020 at 15:45        Chest x-ray findings

## 2020-02-06 PROBLEM — N73.9 PELVIC ABSCESS IN FEMALE: Status: RESOLVED | Noted: 2019-07-20 | Resolved: 2020-02-06

## 2020-02-06 PROBLEM — J43.9 EMPHYSEMA OF LUNG (HCC): Status: ACTIVE | Noted: 2020-02-06

## 2020-02-10 ENCOUNTER — HOSPITAL ENCOUNTER (OUTPATIENT)
Age: 40
Discharge: ANOTHER HOSPITAL | End: 2020-02-10
Attending: FAMILY MEDICINE
Payer: MEDICARE

## 2020-02-10 ENCOUNTER — HOSPITAL ENCOUNTER (EMERGENCY)
Facility: HOSPITAL | Age: 40
Discharge: LEFT BEFORE DISCHARGE | End: 2020-02-10
Payer: MEDICARE

## 2020-02-10 VITALS
HEIGHT: 64 IN | TEMPERATURE: 99 F | WEIGHT: 282 LBS | DIASTOLIC BLOOD PRESSURE: 89 MMHG | HEART RATE: 87 BPM | SYSTOLIC BLOOD PRESSURE: 136 MMHG | RESPIRATION RATE: 20 BRPM | OXYGEN SATURATION: 98 % | BODY MASS INDEX: 48.14 KG/M2

## 2020-02-10 DIAGNOSIS — R10.11 ABDOMINAL PAIN, RIGHT UPPER QUADRANT: Primary | ICD-10-CM

## 2020-02-10 LAB
POCT BILIRUBIN URINE: NEGATIVE
POCT BLOOD URINE: NEGATIVE
POCT GLUCOSE URINE: NEGATIVE MG/DL
POCT KETONE URINE: NEGATIVE MG/DL
POCT LEUKOCYTE ESTERASE URINE: NEGATIVE
POCT NITRITE URINE: NEGATIVE
POCT PH URINE: 5.5 (ref 5–8)
POCT PROTEIN URINE: NEGATIVE MG/DL
POCT SPECIFIC GRAVITY URINE: 1.03
POCT URINE CLARITY: CLEAR
POCT URINE COLOR: YELLOW
POCT UROBILINOGEN URINE: 0.2 MG/DL

## 2020-02-10 PROCEDURE — 81002 URINALYSIS NONAUTO W/O SCOPE: CPT | Performed by: FAMILY MEDICINE

## 2020-02-10 PROCEDURE — 99213 OFFICE O/P EST LOW 20 MIN: CPT

## 2020-02-11 NOTE — ED PROVIDER NOTES
Patient Seen in: [address] [facility]      History   Patient presents with:  Abdomen/Flank Pain    Stated Complaint: RLQ PAIN X 2 WEEKS     HPI    47-year-old female presents with complaints of right upper quadrant abdominal pain for 2 we negative except as noted above.     Physical Exam     ED Triage Vitals [02/10/20 2014]   /89   Pulse 87   Resp 20   Temp 98.6 °F (37 °C)   Temp src Temporal   SpO2 98 %   O2 Device None (Room air)       Current:/89   Pulse 87   Temp 98.6 °F (37

## 2020-02-11 NOTE — ED INITIAL ASSESSMENT (HPI)
Patient c/o intermittent RUQ/RMQ pain x 2 weeks with intermittent diarrhea no vomiting Saw PMD who treated patient for rib pain giving her muscle relaxant and anti inflammatory PAtient stopped those medications thinking they might be causing her diarrhea,

## 2020-06-13 ENCOUNTER — HOSPITAL ENCOUNTER (OUTPATIENT)
Age: 40
Discharge: HOME | End: 2020-06-13
Attending: EMERGENCY MEDICINE
Payer: MEDICARE

## 2020-06-13 VITALS
TEMPERATURE: 98 F | RESPIRATION RATE: 20 BRPM | OXYGEN SATURATION: 98 % | DIASTOLIC BLOOD PRESSURE: 95 MMHG | HEART RATE: 72 BPM | SYSTOLIC BLOOD PRESSURE: 149 MMHG

## 2020-06-13 DIAGNOSIS — K08.89 PAIN, DENTAL: Primary | ICD-10-CM

## 2020-06-13 PROCEDURE — 99204 OFFICE O/P NEW MOD 45 MIN: CPT

## 2020-06-13 PROCEDURE — 99203 OFFICE O/P NEW LOW 30 MIN: CPT

## 2020-06-13 RX ORDER — HYDROCODONE BITARTRATE AND ACETAMINOPHEN 5; 325 MG/1; MG/1
1 TABLET ORAL EVERY 8 HOURS PRN
Qty: 15 TABLET | Refills: 0 | Status: SHIPPED | OUTPATIENT
Start: 2020-06-13 | End: 2020-06-20

## 2020-06-13 RX ORDER — CLINDAMYCIN HYDROCHLORIDE 300 MG/1
300 CAPSULE ORAL 3 TIMES DAILY
Qty: 21 CAPSULE | Refills: 0 | Status: SHIPPED | OUTPATIENT
Start: 2020-06-13 | End: 2020-06-20

## 2020-06-13 NOTE — ED PROVIDER NOTES
Patient Seen in: [facility]      History   Patient presents with:  Dental Problem    Stated Complaint: left wisdom teeth pain    HPI    The patient is a 42-year-old female with past history of congenital deafness, Fournie negative except as noted above.     Physical Exam     ED Triage Vitals [06/13/20 1122]   BP (!) 149/95   Pulse 72   Resp 20   Temp 97.9 °F (36.6 °C)   Temp src Temporal   SpO2 98 %   O2 Device None (Room air)       Current:BP (!) 149/95   Pulse 72   Temp 97 0    Clindamycin HCl 300 MG Oral Cap  Take 1 capsule (300 mg total) by mouth 3 (three) times daily for 7 days.   Qty: 21 capsule Refills: 0

## 2020-06-16 NOTE — ED NOTES
Received a call from pharmacy regarding norco, pt has documented, codeine allergy, dr Maribel Palma not available for clarification at this time, pharmacy will call back tomorrow, advised by this rn not to dispense norco at this time

## 2020-09-28 ENCOUNTER — HOSPITAL ENCOUNTER (EMERGENCY)
Facility: HOSPITAL | Age: 40
Discharge: HOME | End: 2020-09-28
Attending: EMERGENCY MEDICINE
Payer: MEDICARE

## 2020-09-28 ENCOUNTER — APPOINTMENT (OUTPATIENT)
Dept: CT IMAGING | Facility: HOSPITAL | Age: 40
End: 2020-09-28
Attending: EMERGENCY MEDICINE
Payer: MEDICARE

## 2020-09-28 VITALS
HEART RATE: 71 BPM | WEIGHT: 286.63 LBS | BODY MASS INDEX: 48.93 KG/M2 | TEMPERATURE: 97 F | RESPIRATION RATE: 18 BRPM | SYSTOLIC BLOOD PRESSURE: 126 MMHG | OXYGEN SATURATION: 97 % | HEIGHT: 64 IN | DIASTOLIC BLOOD PRESSURE: 78 MMHG

## 2020-09-28 DIAGNOSIS — M54.9 BACK PAIN WITH RADIATION: Primary | ICD-10-CM

## 2020-09-28 LAB
ALBUMIN SERPL-MCNC: 3.5 G/DL (ref 3.4–5)
ALBUMIN/GLOB SERPL: 0.9 {RATIO} (ref 1–2)
ALP LIVER SERPL-CCNC: 94 U/L
ALT SERPL-CCNC: 22 U/L
ANION GAP SERPL CALC-SCNC: 2 MMOL/L (ref 0–18)
AST SERPL-CCNC: 27 U/L (ref 15–37)
BASOPHILS # BLD AUTO: 0.04 X10(3) UL (ref 0–0.2)
BASOPHILS NFR BLD AUTO: 0.4 %
BILIRUB SERPL-MCNC: 0.4 MG/DL (ref 0.1–2)
BILIRUB UR QL STRIP.AUTO: NEGATIVE
BUN BLD-MCNC: 12 MG/DL (ref 7–18)
BUN/CREAT SERPL: 16.2 (ref 10–20)
CALCIUM BLD-MCNC: 8.8 MG/DL (ref 8.5–10.1)
CHLORIDE SERPL-SCNC: 109 MMOL/L (ref 98–112)
CO2 SERPL-SCNC: 26 MMOL/L (ref 21–32)
COLOR UR AUTO: YELLOW
CREAT BLD-MCNC: 0.74 MG/DL
DEPRECATED RDW RBC AUTO: 40 FL (ref 35.1–46.3)
EOSINOPHIL # BLD AUTO: 0.25 X10(3) UL (ref 0–0.7)
EOSINOPHIL NFR BLD AUTO: 2.6 %
ERYTHROCYTE [DISTWIDTH] IN BLOOD BY AUTOMATED COUNT: 12.9 % (ref 11–15)
GLOBULIN PLAS-MCNC: 4.1 G/DL (ref 2.8–4.4)
GLUCOSE BLD-MCNC: 103 MG/DL (ref 70–99)
GLUCOSE UR STRIP.AUTO-MCNC: NEGATIVE MG/DL
HCT VFR BLD AUTO: 40.9 %
HGB BLD-MCNC: 13.9 G/DL
IMM GRANULOCYTES # BLD AUTO: 0.01 X10(3) UL (ref 0–1)
IMM GRANULOCYTES NFR BLD: 0.1 %
KETONES UR STRIP.AUTO-MCNC: NEGATIVE MG/DL
LEUKOCYTE ESTERASE UR QL STRIP.AUTO: NEGATIVE
LYMPHOCYTES # BLD AUTO: 2.42 X10(3) UL (ref 1–4)
LYMPHOCYTES NFR BLD AUTO: 25.5 %
M PROTEIN MFR SERPL ELPH: 7.6 G/DL (ref 6.4–8.2)
MCH RBC QN AUTO: 28.9 PG (ref 26–34)
MCHC RBC AUTO-ENTMCNC: 34 G/DL (ref 31–37)
MCV RBC AUTO: 85 FL
MONOCYTES # BLD AUTO: 0.47 X10(3) UL (ref 0.1–1)
MONOCYTES NFR BLD AUTO: 5 %
NEUTROPHILS # BLD AUTO: 6.3 X10 (3) UL (ref 1.5–7.7)
NEUTROPHILS # BLD AUTO: 6.3 X10(3) UL (ref 1.5–7.7)
NEUTROPHILS NFR BLD AUTO: 66.4 %
NITRITE UR QL STRIP.AUTO: NEGATIVE
OSMOLALITY SERPL CALC.SUM OF ELEC: 284 MOSM/KG (ref 275–295)
PH UR STRIP.AUTO: 7 [PH] (ref 4.5–8)
PLATELET # BLD AUTO: 371 10(3)UL (ref 150–450)
POTASSIUM SERPL-SCNC: 4.9 MMOL/L (ref 3.5–5.1)
PROT UR STRIP.AUTO-MCNC: NEGATIVE MG/DL
RBC # BLD AUTO: 4.81 X10(6)UL
RBC UR QL AUTO: NEGATIVE
SODIUM SERPL-SCNC: 137 MMOL/L (ref 136–145)
SP GR UR STRIP.AUTO: 1.02 (ref 1–1.03)
UROBILINOGEN UR STRIP.AUTO-MCNC: <2 MG/DL
WBC # BLD AUTO: 9.5 X10(3) UL (ref 4–11)

## 2020-09-28 PROCEDURE — 80053 COMPREHEN METABOLIC PANEL: CPT | Performed by: EMERGENCY MEDICINE

## 2020-09-28 PROCEDURE — 81003 URINALYSIS AUTO W/O SCOPE: CPT | Performed by: EMERGENCY MEDICINE

## 2020-09-28 PROCEDURE — 99284 EMERGENCY DEPT VISIT MOD MDM: CPT

## 2020-09-28 PROCEDURE — 36415 COLL VENOUS BLD VENIPUNCTURE: CPT

## 2020-09-28 PROCEDURE — 74176 CT ABD & PELVIS W/O CONTRAST: CPT | Performed by: EMERGENCY MEDICINE

## 2020-09-28 PROCEDURE — 85025 COMPLETE CBC W/AUTO DIFF WBC: CPT | Performed by: EMERGENCY MEDICINE

## 2020-09-28 RX ORDER — CYCLOBENZAPRINE HCL 10 MG
10 TABLET ORAL 3 TIMES DAILY PRN
Qty: 20 TABLET | Refills: 0 | Status: SHIPPED | OUTPATIENT
Start: 2020-09-28 | End: 2020-10-05

## 2020-09-28 NOTE — ED PROVIDER NOTES
Patient Seen in: [facility] Emergency Department      History   Patient presents with:  Abdomen/Flank Pain    Stated Complaint: flank pain    HPI    78-year-old female complaining of right flank pain the patient points to the right mid to lower back Device None (Room air)       Current:/87   Pulse 79   Temp 97.4 °F (36.3 °C) (Tympanic)   Resp 18   Ht 162.6 cm (5' 4\")   Wt 130 kg   LMP 09/16/2020 (Exact Date)   SpO2 97%   BMI 49.19 kg/m²         Physical Exam    Patient is alert and orient x3 no and chemistry unremarkable. MDM      Patient's pain seems to be more muscular in origin no evidence of pyelonephritis or kidney stone patient advised to use Flexeril Tylenol Advil follow-up doctor in few days return if worse.               Dispositio

## 2020-09-28 NOTE — ED INITIAL ASSESSMENT (HPI)
Pt was helping to move her mothers bed and felt pain in her lower abdomen and it extend to right flank backside.  Pt notes that she has taken aleve and that she still has pain to an 8 this am.

## 2020-10-26 ENCOUNTER — OFFICE VISIT (OUTPATIENT)
Dept: OBGYN CLINIC | Facility: CLINIC | Age: 40
End: 2020-10-26
Payer: MEDICARE

## 2020-10-26 VITALS
HEIGHT: 63.75 IN | BODY MASS INDEX: 50.3 KG/M2 | DIASTOLIC BLOOD PRESSURE: 89 MMHG | SYSTOLIC BLOOD PRESSURE: 150 MMHG | HEART RATE: 87 BPM | WEIGHT: 291 LBS

## 2020-10-26 DIAGNOSIS — N92.0 MENORRHAGIA WITH REGULAR CYCLE: ICD-10-CM

## 2020-10-26 DIAGNOSIS — Z12.31 ENCOUNTER FOR SCREENING MAMMOGRAM FOR MALIGNANT NEOPLASM OF BREAST: ICD-10-CM

## 2020-10-26 DIAGNOSIS — Z12.4 SCREENING FOR MALIGNANT NEOPLASM OF CERVIX: ICD-10-CM

## 2020-10-26 DIAGNOSIS — Z11.3 SCREENING FOR STD (SEXUALLY TRANSMITTED DISEASE): ICD-10-CM

## 2020-10-26 DIAGNOSIS — Z01.419 WELL WOMAN EXAM WITH ROUTINE GYNECOLOGICAL EXAM: Primary | ICD-10-CM

## 2020-10-26 PROCEDURE — 3077F SYST BP >= 140 MM HG: CPT | Performed by: CLINICAL NURSE SPECIALIST

## 2020-10-26 PROCEDURE — 3079F DIAST BP 80-89 MM HG: CPT | Performed by: CLINICAL NURSE SPECIALIST

## 2020-10-26 PROCEDURE — 99386 PREV VISIT NEW AGE 40-64: CPT | Performed by: CLINICAL NURSE SPECIALIST

## 2020-10-26 PROCEDURE — 3008F BODY MASS INDEX DOCD: CPT | Performed by: CLINICAL NURSE SPECIALIST

## 2020-10-26 RX ORDER — FLUTICASONE PROPIONATE 50 MCG
SPRAY, SUSPENSION (ML) NASAL DAILY
COMMUNITY
End: 2021-06-01

## 2020-10-29 NOTE — PROGRESS NOTES
Winnie Wang is a 41year old female [id number] Patient's last menstrual period was 10/13/2020 (exact date). Patient presents with:  Gyn Exam: annual exam/NEW PT TO [facility]  New pt. Pt is deaf and here with . Last seen in 2013.  Last Occupational History      Not on file    Social Needs      Financial resource strain: Not on file      Food insecurity        Worry: Not on file        Inability: Not on file      Transportation needs        Medical: Not on file        Non-medical: Not on daily., Disp: , Rfl:   •  ibuprofen 800 MG Oral Tab, TK 1 T PO Q 6-8 H PRN, Disp: , Rfl:   •  Phentermine HCl 15 MG Oral Cap, Take 1 capsule (15 mg total) by mouth every morning.  (Patient not taking: Reported on 10/26/2020 ), Disp: 30 capsule, Rfl: 0  •  t Oriented to time, place, person and situation.  Appropriate mood and affect    Pelvic Exam:  External Genitalia: normal appearance, hair distribution, and no lesions  Urethral Meatus:  normal in size, location, without lesions and prolapse  Bladder:  No ful

## 2021-05-11 ENCOUNTER — IMMUNIZATION (OUTPATIENT)
Dept: LAB | Facility: HOSPITAL | Age: 41
End: 2021-05-11
Attending: HOSPITALIST
Payer: MEDICARE

## 2021-05-11 DIAGNOSIS — Z23 NEED FOR VACCINATION: Primary | ICD-10-CM

## 2021-05-11 PROCEDURE — 0001A SARSCOV2 VAC 30MCG/0.3ML IM: CPT

## 2021-05-18 ENCOUNTER — HOSPITAL ENCOUNTER (OUTPATIENT)
Age: 41
Discharge: HOME | End: 2021-05-18
Payer: MEDICARE

## 2021-05-18 ENCOUNTER — APPOINTMENT (OUTPATIENT)
Dept: GENERAL RADIOLOGY | Age: 41
End: 2021-05-18
Attending: PHYSICIAN ASSISTANT
Payer: MEDICARE

## 2021-05-18 VITALS
SYSTOLIC BLOOD PRESSURE: 126 MMHG | HEIGHT: 65 IN | OXYGEN SATURATION: 97 % | DIASTOLIC BLOOD PRESSURE: 87 MMHG | HEART RATE: 80 BPM | TEMPERATURE: 98 F | BODY MASS INDEX: 47.48 KG/M2 | RESPIRATION RATE: 12 BRPM | WEIGHT: 285 LBS

## 2021-05-18 DIAGNOSIS — S61.031A PUNCTURE WOUND OF RIGHT THUMB, INITIAL ENCOUNTER: Primary | ICD-10-CM

## 2021-05-18 PROCEDURE — 90471 IMMUNIZATION ADMIN: CPT

## 2021-05-18 PROCEDURE — 73140 X-RAY EXAM OF FINGER(S): CPT | Performed by: PHYSICIAN ASSISTANT

## 2021-05-18 PROCEDURE — 99213 OFFICE O/P EST LOW 20 MIN: CPT

## 2021-05-18 NOTE — ED PROVIDER NOTES
Patient Seen in: [facility] [place]      History   Patient presents with:  Rash Skin Problem    Stated Complaint: HANG NAIL PATIENT IS DEAF    HPI/Subjective:   HPI    20-year-old female who is deaf comes in today complaining of puncture wound to Temp 97.9 °F (36.6 °C) (Temporal)   Resp 12   Ht 165.1 cm (5' 5\")   Wt 129.3 kg   LMP 04/20/2021   SpO2 97%   BMI 47.43 kg/m²         Physical Exam  Vitals and nursing note reviewed. Constitutional:       General: She is not in acute distress.      Appea intact. Joint spaces are preserved. No dislocation. No radiopaque foreign body. Well corticated ossification, 2.7 mm in length adjacent to the 1st interphalangeal joint may represent prior injury or a secondary ossification center.             CONCLUSION:

## 2021-05-18 NOTE — ED QUICK NOTES
Pt given discharge instructions per . Pt verbalized understanding. No further questions. Antibiotic ointment and bandaid applied.

## 2021-06-01 ENCOUNTER — IMMUNIZATION (OUTPATIENT)
Dept: LAB | Facility: HOSPITAL | Age: 41
End: 2021-06-01
Attending: EMERGENCY MEDICINE
Payer: MEDICARE

## 2021-06-01 DIAGNOSIS — Z23 NEED FOR VACCINATION: Primary | ICD-10-CM

## 2021-06-01 PROCEDURE — 0002A SARSCOV2 VAC 30MCG/0.3ML IM: CPT

## 2021-08-30 ENCOUNTER — HOSPITAL ENCOUNTER (OUTPATIENT)
Age: 41
Discharge: HOME | End: 2021-08-30
Payer: MEDICARE

## 2021-08-30 ENCOUNTER — APPOINTMENT (OUTPATIENT)
Dept: GENERAL RADIOLOGY | Age: 41
End: 2021-08-30
Attending: NURSE PRACTITIONER
Payer: MEDICARE

## 2021-08-30 VITALS
HEART RATE: 86 BPM | SYSTOLIC BLOOD PRESSURE: 146 MMHG | OXYGEN SATURATION: 98 % | DIASTOLIC BLOOD PRESSURE: 99 MMHG | WEIGHT: 282 LBS | RESPIRATION RATE: 20 BRPM | TEMPERATURE: 99 F | BODY MASS INDEX: 48.14 KG/M2 | HEIGHT: 64 IN

## 2021-08-30 DIAGNOSIS — B34.9 VIRAL SYNDROME: Primary | ICD-10-CM

## 2021-08-30 LAB — SARS-COV-2 RNA RESP QL NAA+PROBE: NOT DETECTED

## 2021-08-30 PROCEDURE — 99214 OFFICE O/P EST MOD 30 MIN: CPT

## 2021-08-30 PROCEDURE — 99213 OFFICE O/P EST LOW 20 MIN: CPT

## 2021-08-30 PROCEDURE — 71046 X-RAY EXAM CHEST 2 VIEWS: CPT | Performed by: NURSE PRACTITIONER

## 2021-08-30 RX ORDER — ALBUTEROL SULFATE 90 UG/1
2 AEROSOL, METERED RESPIRATORY (INHALATION) EVERY 4 HOURS PRN
Qty: 1 EACH | Refills: 0 | Status: SHIPPED | OUTPATIENT
Start: 2021-08-30 | End: 2021-09-29

## 2021-08-30 RX ORDER — BENZONATATE 100 MG/1
100 CAPSULE ORAL 3 TIMES DAILY PRN
Qty: 30 CAPSULE | Refills: 0 | Status: SHIPPED | OUTPATIENT
Start: 2021-08-30 | End: 2021-09-29

## 2021-08-31 NOTE — ED PROVIDER NOTES
Patient Seen in: [facility] [place]      History   Patient presents with:  Covid-19 Test  Cough    Stated Complaint: Covid-19 Test -DEAF PT Having cough, sore throat, running nose fever and diffic*    HPI/Subjective:   HPI  41-year-old female who reviewed. All other systems reviewed and negative except as noted above.     Physical Exam     ED Triage Vitals [08/30/21 1944]   BP (!) 146/99   Pulse 86   Resp 20   Temp 98.7 °F (37.1 °C)   Temp src Oral   SpO2 98 %   O2 Device None (Room air) productive cough for three days. FINDINGS:  LUNGS:  No focal consolidation. Normal vascularity. CARDIAC:  Normal size cardiac silhouette. MEDIASTINUM:  Normal. PLEURA:  No pleural effusion or pneumothorax.  BONES:  Degenerative changes are seen in the s

## 2022-02-14 ENCOUNTER — OFFICE VISIT (OUTPATIENT)
Dept: OBGYN CLINIC | Facility: CLINIC | Age: 42
End: 2022-02-14
Payer: MEDICARE

## 2022-02-14 VITALS
SYSTOLIC BLOOD PRESSURE: 142 MMHG | HEIGHT: 63 IN | HEART RATE: 85 BPM | WEIGHT: 290 LBS | BODY MASS INDEX: 51.38 KG/M2 | DIASTOLIC BLOOD PRESSURE: 93 MMHG

## 2022-02-14 DIAGNOSIS — R10.2 PELVIC PAIN: ICD-10-CM

## 2022-02-14 DIAGNOSIS — Z11.3 SCREENING FOR STD (SEXUALLY TRANSMITTED DISEASE): ICD-10-CM

## 2022-02-14 DIAGNOSIS — Z01.419 WELL WOMAN EXAM WITH ROUTINE GYNECOLOGICAL EXAM: Primary | ICD-10-CM

## 2022-02-14 DIAGNOSIS — Z12.31 ENCOUNTER FOR SCREENING MAMMOGRAM FOR MALIGNANT NEOPLASM OF BREAST: ICD-10-CM

## 2022-02-14 DIAGNOSIS — N94.10 FEMALE DYSPAREUNIA: ICD-10-CM

## 2022-02-14 PROCEDURE — 3008F BODY MASS INDEX DOCD: CPT | Performed by: CLINICAL NURSE SPECIALIST

## 2022-02-14 PROCEDURE — 3080F DIAST BP >= 90 MM HG: CPT | Performed by: CLINICAL NURSE SPECIALIST

## 2022-02-14 PROCEDURE — 3077F SYST BP >= 140 MM HG: CPT | Performed by: CLINICAL NURSE SPECIALIST

## 2022-02-14 PROCEDURE — G0101 CA SCREEN;PELVIC/BREAST EXAM: HCPCS | Performed by: CLINICAL NURSE SPECIALIST

## 2022-02-15 LAB — C TRACH DNA SPEC QL NAA+PROBE: NEGATIVE

## 2022-02-16 LAB — T VAGINALIS RRNA SPEC QL NAA+PROBE: NEGATIVE

## 2022-04-12 ENCOUNTER — HOSPITAL ENCOUNTER (OUTPATIENT)
Age: 42
Discharge: HOME | End: 2022-04-12
Payer: MEDICARE

## 2022-04-12 VITALS
TEMPERATURE: 97 F | BODY MASS INDEX: 49.51 KG/M2 | HEART RATE: 89 BPM | RESPIRATION RATE: 19 BRPM | DIASTOLIC BLOOD PRESSURE: 74 MMHG | OXYGEN SATURATION: 96 % | SYSTOLIC BLOOD PRESSURE: 131 MMHG | WEIGHT: 290 LBS | HEIGHT: 64 IN

## 2022-04-12 DIAGNOSIS — J01.10 ACUTE NON-RECURRENT FRONTAL SINUSITIS: Primary | ICD-10-CM

## 2022-04-12 LAB
POCT INFLUENZA A: NEGATIVE
POCT INFLUENZA B: NEGATIVE
SARS-COV-2 RNA RESP QL NAA+PROBE: NOT DETECTED

## 2022-04-12 PROCEDURE — 99213 OFFICE O/P EST LOW 20 MIN: CPT

## 2022-04-12 PROCEDURE — 87502 INFLUENZA DNA AMP PROBE: CPT | Performed by: PHYSICIAN ASSISTANT

## 2022-04-12 PROCEDURE — 99214 OFFICE O/P EST MOD 30 MIN: CPT

## 2022-04-12 RX ORDER — METHYLPREDNISOLONE 4 MG/1
TABLET ORAL
Qty: 1 EACH | Refills: 0 | Status: SHIPPED | OUTPATIENT
Start: 2022-04-12

## 2022-04-12 NOTE — ED INITIAL ASSESSMENT (HPI)
Patient c/o nasal congestion, facial pressure since last night. States that she had a sore throat yesterday but none today. Chills last night. Patient also has a dry cough with chest congestion.  Patient is vaccinated against covid

## 2022-05-17 ENCOUNTER — HOSPITAL ENCOUNTER (OUTPATIENT)
Dept: ULTRASOUND IMAGING | Facility: HOSPITAL | Age: 42
Discharge: HOME | End: 2022-05-17
Attending: CLINICAL NURSE SPECIALIST
Payer: MEDICARE

## 2022-05-17 DIAGNOSIS — N94.10 FEMALE DYSPAREUNIA: ICD-10-CM

## 2022-05-17 DIAGNOSIS — R10.2 PELVIC PAIN: ICD-10-CM

## 2022-05-17 PROCEDURE — 76856 US EXAM PELVIC COMPLETE: CPT | Performed by: CLINICAL NURSE SPECIALIST

## 2022-05-17 PROCEDURE — 93975 VASCULAR STUDY: CPT | Performed by: CLINICAL NURSE SPECIALIST

## 2022-05-17 PROCEDURE — 76830 TRANSVAGINAL US NON-OB: CPT | Performed by: CLINICAL NURSE SPECIALIST

## 2022-05-18 NOTE — PROGRESS NOTES
Patient uses sign language. MAF patient.  Please inform patient her US is normal.    Darrick Kayser, APRN

## 2022-11-16 ENCOUNTER — HOSPITAL ENCOUNTER (OUTPATIENT)
Age: 42
Discharge: HOME | End: 2022-11-16
Attending: EMERGENCY MEDICINE
Payer: MEDICARE

## 2022-11-16 VITALS
TEMPERATURE: 98 F | DIASTOLIC BLOOD PRESSURE: 93 MMHG | RESPIRATION RATE: 18 BRPM | SYSTOLIC BLOOD PRESSURE: 183 MMHG | HEART RATE: 72 BPM | OXYGEN SATURATION: 98 %

## 2022-11-16 DIAGNOSIS — G43.001 MIGRAINE WITHOUT AURA AND WITH STATUS MIGRAINOSUS, NOT INTRACTABLE: Primary | ICD-10-CM

## 2022-11-16 PROCEDURE — 96372 THER/PROPH/DIAG INJ SC/IM: CPT

## 2022-11-16 PROCEDURE — 99214 OFFICE O/P EST MOD 30 MIN: CPT

## 2022-11-16 RX ORDER — BUTALBITAL, ACETAMINOPHEN AND CAFFEINE 50; 325; 40 MG/1; MG/1; MG/1
1-2 TABLET ORAL 3 TIMES DAILY PRN
Qty: 10 TABLET | Refills: 0 | Status: SHIPPED | OUTPATIENT
Start: 2022-11-16 | End: 2022-11-21

## 2022-11-16 RX ORDER — SUMATRIPTAN 6 MG/.5ML
6 INJECTION, SOLUTION SUBCUTANEOUS ONCE
Status: COMPLETED | OUTPATIENT
Start: 2022-11-16 | End: 2022-11-16

## 2022-11-16 NOTE — ED INITIAL ASSESSMENT (HPI)
C/o pounding headache x 4 days. Took Advil today at 0800 with no relief. Sensitive to light. Denies head injury or fevers.

## 2023-05-20 ENCOUNTER — APPOINTMENT (OUTPATIENT)
Dept: ULTRASOUND IMAGING | Facility: HOSPITAL | Age: 43
End: 2023-05-20
Attending: EMERGENCY MEDICINE
Payer: MEDICARE

## 2023-05-20 ENCOUNTER — HOSPITAL ENCOUNTER (EMERGENCY)
Facility: HOSPITAL | Age: 43
Discharge: HOME | End: 2023-05-20
Attending: EMERGENCY MEDICINE
Payer: MEDICARE

## 2023-05-20 VITALS
OXYGEN SATURATION: 96 % | BODY MASS INDEX: 49.17 KG/M2 | WEIGHT: 288 LBS | TEMPERATURE: 97 F | DIASTOLIC BLOOD PRESSURE: 86 MMHG | HEIGHT: 64 IN | RESPIRATION RATE: 20 BRPM | HEART RATE: 65 BPM | SYSTOLIC BLOOD PRESSURE: 145 MMHG

## 2023-05-20 DIAGNOSIS — R10.9 ABDOMINAL PAIN, ACUTE: Primary | ICD-10-CM

## 2023-05-20 DIAGNOSIS — N83.201 CYST OF RIGHT OVARY: ICD-10-CM

## 2023-05-20 LAB
ALBUMIN SERPL-MCNC: 3.8 G/DL (ref 3.4–5)
ALBUMIN/GLOB SERPL: 1.1 {RATIO} (ref 1–2)
ALP LIVER SERPL-CCNC: 93 U/L
ALT SERPL-CCNC: 23 U/L
ANION GAP SERPL CALC-SCNC: 2 MMOL/L (ref 0–18)
AST SERPL-CCNC: 14 U/L (ref 15–37)
B-HCG UR QL: NEGATIVE
BASOPHILS # BLD AUTO: 0.04 X10(3) UL (ref 0–0.2)
BASOPHILS NFR BLD AUTO: 0.4 %
BILIRUB SERPL-MCNC: 0.4 MG/DL (ref 0.1–2)
BILIRUB UR QL STRIP.AUTO: NEGATIVE
BUN BLD-MCNC: 14 MG/DL (ref 7–18)
CALCIUM BLD-MCNC: 9.1 MG/DL (ref 8.5–10.1)
CHLORIDE SERPL-SCNC: 109 MMOL/L (ref 98–112)
CLARITY UR REFRACT.AUTO: CLEAR
CO2 SERPL-SCNC: 29 MMOL/L (ref 21–32)
COLOR UR AUTO: YELLOW
CREAT BLD-MCNC: 0.59 MG/DL
EOSINOPHIL # BLD AUTO: 0.38 X10(3) UL (ref 0–0.7)
EOSINOPHIL NFR BLD AUTO: 3.5 %
ERYTHROCYTE [DISTWIDTH] IN BLOOD BY AUTOMATED COUNT: 13.5 %
GFR SERPLBLD BASED ON 1.73 SQ M-ARVRAT: 114 ML/MIN/1.73M2 (ref 60–?)
GLOBULIN PLAS-MCNC: 3.6 G/DL (ref 2.8–4.4)
GLUCOSE BLD-MCNC: 113 MG/DL (ref 70–99)
GLUCOSE UR STRIP.AUTO-MCNC: NEGATIVE MG/DL
HCT VFR BLD AUTO: 41.7 %
HGB BLD-MCNC: 13.5 G/DL
IMM GRANULOCYTES # BLD AUTO: 0.03 X10(3) UL (ref 0–1)
IMM GRANULOCYTES NFR BLD: 0.3 %
KETONES UR STRIP.AUTO-MCNC: NEGATIVE MG/DL
LEUKOCYTE ESTERASE UR QL STRIP.AUTO: NEGATIVE
LYMPHOCYTES # BLD AUTO: 2.78 X10(3) UL (ref 1–4)
LYMPHOCYTES NFR BLD AUTO: 25.9 %
MCH RBC QN AUTO: 28 PG (ref 26–34)
MCHC RBC AUTO-ENTMCNC: 32.4 G/DL (ref 31–37)
MCV RBC AUTO: 86.5 FL
MONOCYTES # BLD AUTO: 0.52 X10(3) UL (ref 0.1–1)
MONOCYTES NFR BLD AUTO: 4.8 %
NEUTROPHILS # BLD AUTO: 6.99 X10 (3) UL (ref 1.5–7.7)
NEUTROPHILS # BLD AUTO: 6.99 X10(3) UL (ref 1.5–7.7)
NEUTROPHILS NFR BLD AUTO: 65.1 %
NITRITE UR QL STRIP.AUTO: NEGATIVE
OSMOLALITY SERPL CALC.SUM OF ELEC: 291 MOSM/KG (ref 275–295)
PH UR STRIP.AUTO: 5 [PH] (ref 5–8)
PLATELET # BLD AUTO: 400 10(3)UL (ref 150–450)
POTASSIUM SERPL-SCNC: 3.3 MMOL/L (ref 3.5–5.1)
PROT SERPL-MCNC: 7.4 G/DL (ref 6.4–8.2)
PROT UR STRIP.AUTO-MCNC: NEGATIVE MG/DL
RBC # BLD AUTO: 4.82 X10(6)UL
RBC UR QL AUTO: NEGATIVE
SODIUM SERPL-SCNC: 140 MMOL/L (ref 136–145)
SP GR UR STRIP.AUTO: 1.02 (ref 1–1.03)
UROBILINOGEN UR STRIP.AUTO-MCNC: <2 MG/DL
WBC # BLD AUTO: 10.7 X10(3) UL (ref 4–11)

## 2023-05-20 PROCEDURE — 76856 US EXAM PELVIC COMPLETE: CPT | Performed by: EMERGENCY MEDICINE

## 2023-05-20 PROCEDURE — 76830 TRANSVAGINAL US NON-OB: CPT | Performed by: EMERGENCY MEDICINE

## 2023-05-20 PROCEDURE — 85025 COMPLETE CBC W/AUTO DIFF WBC: CPT | Performed by: EMERGENCY MEDICINE

## 2023-05-20 PROCEDURE — 93975 VASCULAR STUDY: CPT | Performed by: EMERGENCY MEDICINE

## 2023-05-20 PROCEDURE — 99285 EMERGENCY DEPT VISIT HI MDM: CPT

## 2023-05-20 PROCEDURE — 80053 COMPREHEN METABOLIC PANEL: CPT | Performed by: EMERGENCY MEDICINE

## 2023-05-20 PROCEDURE — 81003 URINALYSIS AUTO W/O SCOPE: CPT | Performed by: EMERGENCY MEDICINE

## 2023-05-20 PROCEDURE — 85025 COMPLETE CBC W/AUTO DIFF WBC: CPT

## 2023-05-20 PROCEDURE — 96374 THER/PROPH/DIAG INJ IV PUSH: CPT

## 2023-05-20 PROCEDURE — 99284 EMERGENCY DEPT VISIT MOD MDM: CPT

## 2023-05-20 PROCEDURE — 96361 HYDRATE IV INFUSION ADD-ON: CPT

## 2023-05-20 PROCEDURE — 80053 COMPREHEN METABOLIC PANEL: CPT

## 2023-05-20 PROCEDURE — 81025 URINE PREGNANCY TEST: CPT

## 2023-05-20 PROCEDURE — 96375 TX/PRO/DX INJ NEW DRUG ADDON: CPT

## 2023-05-20 RX ORDER — HYDROMORPHONE HYDROCHLORIDE 1 MG/ML
0.5 INJECTION, SOLUTION INTRAMUSCULAR; INTRAVENOUS; SUBCUTANEOUS EVERY 30 MIN PRN
Status: DISCONTINUED | OUTPATIENT
Start: 2023-05-20 | End: 2023-05-21

## 2023-05-20 RX ORDER — POTASSIUM CHLORIDE 20 MEQ/1
40 TABLET, EXTENDED RELEASE ORAL ONCE
Status: COMPLETED | OUTPATIENT
Start: 2023-05-20 | End: 2023-05-20

## 2023-05-20 RX ORDER — SODIUM CHLORIDE 9 MG/ML
INJECTION, SOLUTION INTRAVENOUS CONTINUOUS
Status: DISCONTINUED | OUTPATIENT
Start: 2023-05-20 | End: 2023-05-21

## 2023-05-20 RX ORDER — ONDANSETRON 2 MG/ML
4 INJECTION INTRAMUSCULAR; INTRAVENOUS ONCE
Status: COMPLETED | OUTPATIENT
Start: 2023-05-20 | End: 2023-05-20

## 2023-05-20 NOTE — ED INITIAL ASSESSMENT (HPI)
Triage completed using paper and pen to write out triage questions, patient declined video . Patient presents for evaluation of right lower quadrant abdominal pain that radiates to the back. She states this has been going on for approximately one month and got much worse today. Denies urinary symptoms. Reports increasing cramping pain as well. LMP last week.

## 2023-05-21 NOTE — DISCHARGE INSTRUCTIONS
Every month your ovary produces eggs in a follicle. Sometimes the follicles become enlarged and cause pain. Usually these release the egg which can cause temporary worsening of the pain, but then the pain resolves. This usually happens over the next 1-2 menstrual cycles. Try naproxen 2 tablets with food twice a day as needed for pain. Acetaminophen as needed for pain. Warm packs to the painful areas. Follow-up with your primary physician and gynecology.   Return for any problems

## 2023-05-21 NOTE — ED QUICK NOTES
Received report from miguel a BARRERA. Rounded on pt. Pt resting comfortably in bed. Aware we are waiting for [address]. Communicated with using white board.

## 2023-09-03 ENCOUNTER — HOSPITAL ENCOUNTER (OUTPATIENT)
Age: 43
Discharge: HOME | End: 2023-09-03
Attending: EMERGENCY MEDICINE
Payer: MEDICARE

## 2023-09-03 ENCOUNTER — APPOINTMENT (OUTPATIENT)
Dept: GENERAL RADIOLOGY | Age: 43
End: 2023-09-03
Attending: EMERGENCY MEDICINE
Payer: MEDICARE

## 2023-09-03 VITALS
DIASTOLIC BLOOD PRESSURE: 83 MMHG | SYSTOLIC BLOOD PRESSURE: 157 MMHG | RESPIRATION RATE: 20 BRPM | TEMPERATURE: 97 F | HEART RATE: 76 BPM | OXYGEN SATURATION: 99 %

## 2023-09-03 DIAGNOSIS — M54.50 BACK PAIN, LUMBOSACRAL: Primary | ICD-10-CM

## 2023-09-03 PROCEDURE — 99213 OFFICE O/P EST LOW 20 MIN: CPT

## 2023-09-03 PROCEDURE — 72100 X-RAY EXAM L-S SPINE 2/3 VWS: CPT | Performed by: EMERGENCY MEDICINE

## 2023-09-03 RX ORDER — IBUPROFEN 600 MG/1
600 TABLET ORAL EVERY 8 HOURS PRN
Qty: 20 TABLET | Refills: 0 | Status: SHIPPED | OUTPATIENT
Start: 2023-09-03

## 2023-09-03 RX ORDER — CYCLOBENZAPRINE HCL 10 MG
10 TABLET ORAL 3 TIMES DAILY PRN
Qty: 20 TABLET | Refills: 0 | Status: SHIPPED | OUTPATIENT
Start: 2023-09-03 | End: 2023-09-10

## 2023-09-03 NOTE — DISCHARGE INSTRUCTIONS
Follow-up with your primary care doctor  Take ibuprofen as needed for pain  Take Flexeril as needed for muscle spasm  Advance activity as tolerated

## 2023-12-12 NOTE — ED INITIAL ASSESSMENT (HPI)
nausa, diarrhea, loss of appetite, chills, abdominal pain and body aches x 2 days. Had fever 2 days ago but dont feel like having a fever now.
Clear
home

## 2024-10-23 ENCOUNTER — APPOINTMENT (OUTPATIENT)
Dept: GENERAL RADIOLOGY | Age: 44
End: 2024-10-23
Attending: NURSE PRACTITIONER
Payer: MEDICARE

## 2024-10-23 ENCOUNTER — HOSPITAL ENCOUNTER (OUTPATIENT)
Age: 44
Discharge: HOME | End: 2024-10-23
Payer: MEDICARE

## 2024-10-23 VITALS
SYSTOLIC BLOOD PRESSURE: 139 MMHG | OXYGEN SATURATION: 97 % | WEIGHT: 282 LBS | HEIGHT: 63 IN | BODY MASS INDEX: 49.96 KG/M2 | DIASTOLIC BLOOD PRESSURE: 90 MMHG | RESPIRATION RATE: 20 BRPM | TEMPERATURE: 98 F | HEART RATE: 79 BPM

## 2024-10-23 DIAGNOSIS — M54.17 LUMBOSACRAL RADICULOPATHY: Primary | ICD-10-CM

## 2024-10-23 PROCEDURE — 99214 OFFICE O/P EST MOD 30 MIN: CPT

## 2024-10-23 PROCEDURE — 99213 OFFICE O/P EST LOW 20 MIN: CPT

## 2024-10-23 PROCEDURE — 72110 X-RAY EXAM L-2 SPINE 4/>VWS: CPT | Performed by: NURSE PRACTITIONER

## 2024-10-23 PROCEDURE — 71101 X-RAY EXAM UNILAT RIBS/CHEST: CPT | Performed by: NURSE PRACTITIONER

## 2024-10-23 RX ORDER — CYCLOBENZAPRINE HCL 10 MG
10 TABLET ORAL 3 TIMES DAILY PRN
Qty: 20 TABLET | Refills: 0 | Status: SHIPPED | OUTPATIENT
Start: 2024-10-23 | End: 2024-10-30

## 2024-10-23 RX ORDER — PREDNISONE 20 MG/1
20 TABLET ORAL 2 TIMES DAILY
Qty: 10 TABLET | Refills: 0 | Status: SHIPPED | OUTPATIENT
Start: 2024-10-23 | End: 2024-10-28

## 2024-10-23 NOTE — ED PROVIDER NOTES
Patient Seen in: [facility]      History     Chief Complaint   Patient presents with    Back Pain     Stated Complaint: low back pain wraps around    Subjective:   HPI      44-year-old female who is deaf presents today with complaints of lower back pain for several weeks.  Patient states that she does see an orthopedic in regards to this lower back pain.  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  Patient denies any loss of bowel or bladder function.  Patient states that this occurrence started a few days ago and she is experiencing more pain when she is turning at the torso.  Patient states she has been taking naproxen that was prescribed to her with little relief.    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    Removal gallbladder                  Social History     Socioeconomic History    Marital status: Single   Tobacco Use    Smoking status: Former     Current packs/day: 0.00     Average packs/day: 0.5 packs/day for 15.0 years (7.5 ttl pk-yrs)     Types: Cigarettes     Start date: 2005     Quit date: 2020     Years since quittin.8    Smokeless tobacco: Never    Tobacco comments:     1/2 q 2 weeks   Vaping Use    Vaping status: Never Used   Substance and Sexual Activity    Alcohol use: No     Alcohol/week: 0.0 standard drinks of alcohol    Drug use: Not Currently     Types: Cannabis     Comment: every night for pain    Sexual activity: Yes     Partners: Male     Social Drivers of Health      Received from [facility]              Review of Systems   Constitutional: Negative.    HENT: Negative.      Eyes: Negative.    Respiratory: Negative.     Cardiovascular: Negative.    Gastrointestinal: Negative.    Endocrine: Negative.    Genitourinary: Negative.    Musculoskeletal:  Positive for back pain.   Skin: Negative.    Allergic/Immunologic: Negative.    Neurological: Negative.    Hematological: Negative.    Psychiatric/Behavioral: Negative.         Positive for stated complaint: low back pain wraps around  Other systems are as noted in HPI.  Constitutional and vital signs reviewed.      All other systems reviewed and negative except as noted above.    Physical Exam     ED Triage Vitals [10/23/24 1116]   /90   Pulse 79   Resp 20   Temp 97.9 °F (36.6 °C)   Temp src Temporal   SpO2 97 %   O2 Device None (Room air)       Current Vitals:   Vital Signs  BP: 139/90  Pulse: 79  Resp: 20  Temp: 97.9 °F (36.6 °C)  Temp src: Temporal    Oxygen Therapy  SpO2: 97 %  O2 Device: None (Room air)        Physical Exam  Vitals and nursing note reviewed.   Constitutional:       Appearance: She is obese.   HENT:      Head: Normocephalic.      Right Ear: External ear normal.      Left Ear: External ear normal.   Eyes:      Extraocular Movements: Extraocular movements intact.      Conjunctiva/sclera: Conjunctivae normal.      Pupils: Pupils are equal, round, and reactive to light.   Cardiovascular:      Rate and Rhythm: Normal rate and regular rhythm.      Pulses: Normal pulses.      Heart sounds: Normal heart sounds.   Pulmonary:      Effort: Pulmonary effort is normal.      Breath sounds: Normal breath sounds.   Abdominal:      General: Abdomen is flat. Bowel sounds are normal.      Palpations: Abdomen is soft.   Musculoskeletal:         General: Tenderness present.      Cervical back: Normal range of motion and neck supple.      Comments: Pain elicited with twisting at the waist.  Tender to palpation to the right anterior lower rib region.  Bilateral lung sounds equal.  Pain elicited with push and pull.   Skin:     General:  Skin is warm.      Capillary Refill: Capillary refill takes less than 2 seconds.   Neurological:      General: No focal deficit present.      Mental Status: She is alert.   Psychiatric:         Mood and Affect: Mood normal.           ED Course   Labs Reviewed - No data to display              MDM      44-year-old female who is deaf presents today with complaints of lower back pain for several weeks.  Patient states that she does see an orthopedic in regards to this lower back pain.  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  Patient denies any loss of bowel or bladder function.  Patient states that this occurrence started a few days ago and she is experiencing more pain when she is turning at the torso.  Patient states she has been taking naproxen that was prescribed to her with little relief.  Vital signs: Please see EMR.  Physical exam: Please see exam.  Differential diagnosis: Rib fracture, rib sprain, lumbar strain, lumbar radiculopathy.  XR LUMBAR SPINE (MIN 4 VIEWS) (CPT=72110)    Result Date: 10/23/2024  CONCLUSION:  Hypertrophic degenerative changes lumbar spine most severe in the lower lumbar levels..   LOCATION:  [id number]   Dictated by (CST): Irina Corbin MD on 10/23/2024 at 12:28 PM     Finalized by (CST): Irina Corbin MD on 10/23/2024 at 12:28 PM       XR RIBS WITH CHEST (3 VIEWS), RIGHT  (CPT=71101)    Result Date: 10/23/2024  CONCLUSION:  Negative chest and right rib views.   LOCATION:  [id number]     Dictated by (CST): Irina Corbin MD on 10/23/2024 at 12:26 PM     Finalized by (CST): Irina Corbin MD on 10/23/2024 at 12:27 PM      Based on physical exam and HPI will diagnosed with lumbar sacral radiculopathy.  Will prescribe prednisone and cyclobenzaprine.  Patient is to follow-up with her orthopedic.  ED precautions given.      Medical Decision Making  44-year-old female who is deaf presents today with complaints of lower back pain for several  weeks.  Patient states that she does see an orthopedic in regards to this lower back pain.  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  Patient denies any loss of bowel or bladder function.  Patient states that this occurrence started a few days ago and she is experiencing more pain when she is turning at the torso.  Patient states she has been taking naproxen that was prescribed to her with little relief.    Problems Addressed:  Lumbosacral radiculopathy: acute illness or injury    Amount and/or Complexity of Data Reviewed  Radiology: ordered. Decision-making details documented in ED Course.    Risk  Prescription drug management.        Disposition and Plan     Clinical Impression:  1. Lumbosacral radiculopathy         Disposition:  Discharge  10/23/2024 12:36 pm    Follow-up:  Jaxon Borjas MD  [address] [id number] [phone]    In 1 week            Medications Prescribed:  Current Discharge Medication List        START taking these medications    Details   predniSONE 20 MG Oral Tab Take 1 tablet (20 mg total) by mouth 2 (two) times daily for 5 days.  Qty: 10 tablet, Refills: 0      cyclobenzaprine 10 MG Oral Tab Take 1 tablet (10 mg total) by mouth 3 (three) times daily as needed for Muscle spasms.  Qty: 20 tablet, Refills: 0                 Supplementary Documentation:

## 2024-10-23 NOTE — DISCHARGE INSTRUCTIONS
Do not drive while taking muscle relaxer as it may cause drowsiness.  If your symptoms persist after 1 week please follow-up with your orthopedic.

## 2024-10-23 NOTE — ED INITIAL ASSESSMENT (HPI)
Back pain that comes and goes over the last few months but worse today. No aggrevating factors. But does lift mothers scooter and at work.

## (undated) DEVICE — SPONGE STICK WITH PVP-I: Brand: KENDALL

## (undated) DEVICE — Device

## (undated) DEVICE — MATTRESS PT HOVERMATT 1/2L 34W

## (undated) DEVICE — GYN CDS: Brand: MEDLINE INDUSTRIES, INC.

## (undated) DEVICE — CAUTERY PENCIL

## (undated) DEVICE — SOL  .9 1000ML BTL

## (undated) DEVICE — KENDALL SCD EXPRESS SLEEVES, KNEE LENGTH, MEDIUM: Brand: KENDALL SCD

## (undated) DEVICE — SCRUB PVP -1 PREP SOLUTION 4OZ

## (undated) DEVICE — GLOVE ORTHO ALOETOUCH SZ 8-1/2

## (undated) DEVICE — LAPAROTOMY SPONGE - RF AND X-RAY DETECTABLE PRE-WASHED: Brand: SITUATE

## (undated) DEVICE — BANDAGE ROLL,100% COTTON, 6 PLY, LARGE: Brand: KERLIX

## (undated) NOTE — ED AVS SNAPSHOT
Fabiola Gomez   MRN: [id number]    Department:  [facility] Emergency Department   Date of Visit:  10/21/2017           Disclosure     Insurance plans vary and the physician(s) referred by the ER may not be covered by your plan.  Please contact If you have been prescribed any medication(s), please fill your prescription right away and begin taking the medication(s) as directed    If the emergency physician has read X-rays, these will be re-interpreted by a radiologist.  If there is a significant

## (undated) NOTE — LETTER
[facility] IN [place]  [address] [address]  Dept: [phone]  Dept Fax: [phone]      January 23, 2018    Patient: Diego Thacker   Date of Visit: 1/23/2018       To Whom It May Concern:    Eryn Hernandez was seen and t

## (undated) NOTE — ED AVS SNAPSHOT
[facility] Emergency Department    [place]  [address] [address]    Phone:  [phone]    Fax:  [phone]           Esther Marcano   MRN: [id number]    Department:  [facility] Emergency Department   Date of Visit:  5/ If you have any problems with your follow-up, please call our  at [phone]    Si usted tiene algun problema con shah sequimiento, por favor llame a nuestro adminstrador de casos al [phone]    Expect to receive an electronic request Phuong Olivo [address] ([address]) Vika ([address] [address] [facility]9   [facility] [address] ([address] [facility]) [address]  (Do no further workup is needed. If patient is at high risk, follow CT chest in one year is recommended.            Dictated by: Maryann Bell MD on 5/01/2017 at 23:10       Approved by: Maryann Bell MD              Narrative:    PROCEDURE:  CT ABDOMEN+PEL If you've recently had a stay at the Hospital you can access your discharge instructions in [place] by going to Visits < Admission Summaries.  If you've been to the Emergency Department or your doctor's office, you can view your past visit information in My

## (undated) NOTE — ED AVS SNAPSHOT
Chanel Cedeno   MRN: [id number]    Department:  [facility] Emergency Department   Date of Visit:  11/8/2018           Disclosure     Insurance plans vary and the physician(s) referred by the ER may not be covered by your plan.  Please contact y tell this physician (or your personal doctor if your instructions are to return to your personal doctor) about any new or lasting problems. The primary care or specialist physician will see patients referred from the [facility] Emergency Department.  [place]

## (undated) NOTE — ED AVS SNAPSHOT
[facility] Emergency Department    [place]  [address] [address]    Phone:  [phone]    Fax:  [phone]           Aleks Baxter   MRN: [id number]    Department:  [facility] Emergency Department   Date of Visit:  5/ IF THERE IS ANY CHANGE OR WORSENING OF YOUR CONDITION, CALL YOUR PRIMARY CARE PHYSICIAN AT ONCE OR RETURN IMMEDIATELY TO THE EMERGENCY DEPARTMENT.     If you have been prescribed any medication(s), please fill your prescription right away and begin taking t